# Patient Record
Sex: FEMALE | Race: WHITE | NOT HISPANIC OR LATINO | URBAN - METROPOLITAN AREA
[De-identification: names, ages, dates, MRNs, and addresses within clinical notes are randomized per-mention and may not be internally consistent; named-entity substitution may affect disease eponyms.]

---

## 2017-06-13 ENCOUNTER — INPATIENT (INPATIENT)
Facility: HOSPITAL | Age: 37
LOS: 2 days | Discharge: HOME | DRG: 355 | End: 2017-06-16
Attending: SURGERY | Admitting: SURGERY
Payer: COMMERCIAL

## 2017-06-13 DIAGNOSIS — Z98.890 OTHER SPECIFIED POSTPROCEDURAL STATES: Chronic | ICD-10-CM

## 2017-06-13 PROCEDURE — 99285 EMERGENCY DEPT VISIT HI MDM: CPT | Mod: 25

## 2017-06-13 NOTE — ED ADULT TRIAGE NOTE - CHIEF COMPLAINT QUOTE
abd pain x 2 wks, +nausea/vomiting, recently treated for strep, denies diarrhea/fever, (hx "tummy tuck" x 4 yrs ago)

## 2017-06-13 NOTE — ED ADULT NURSE NOTE - OBJECTIVE STATEMENT
36 year old female patient presents to ED c/o intermittent generalized abdominal pain and bloating x 2 weeks. Patient went to urgent care today and sent to ER for US to r/o gallstones. Patient reports abd pain and discomfort upon movement, denies pain at rest. Denies taking home medications for pain. Denies n/v/d, fever, chills, urinary symptoms. VSS

## 2017-06-14 VITALS
OXYGEN SATURATION: 98 % | TEMPERATURE: 98 F | SYSTOLIC BLOOD PRESSURE: 133 MMHG | HEART RATE: 73 BPM | RESPIRATION RATE: 16 BRPM | DIASTOLIC BLOOD PRESSURE: 84 MMHG

## 2017-06-14 DIAGNOSIS — K42.9 UMBILICAL HERNIA WITHOUT OBSTRUCTION OR GANGRENE: ICD-10-CM

## 2017-06-14 LAB
ALBUMIN SERPL ELPH-MCNC: 4.4 G/DL — SIGNIFICANT CHANGE UP (ref 3.3–5)
ALP SERPL-CCNC: 53 U/L — SIGNIFICANT CHANGE UP (ref 40–120)
ALT FLD-CCNC: 21 U/L RC — SIGNIFICANT CHANGE UP (ref 10–45)
ANION GAP SERPL CALC-SCNC: 15 MMOL/L — SIGNIFICANT CHANGE UP (ref 5–17)
APPEARANCE UR: CLEAR — SIGNIFICANT CHANGE UP
AST SERPL-CCNC: 18 U/L — SIGNIFICANT CHANGE UP (ref 10–40)
BASOPHILS # BLD AUTO: 0.1 K/UL — SIGNIFICANT CHANGE UP (ref 0–0.2)
BASOPHILS NFR BLD AUTO: 0.7 % — SIGNIFICANT CHANGE UP (ref 0–2)
BILIRUB SERPL-MCNC: 0.3 MG/DL — SIGNIFICANT CHANGE UP (ref 0.2–1.2)
BILIRUB UR-MCNC: NEGATIVE — SIGNIFICANT CHANGE UP
BUN SERPL-MCNC: 20 MG/DL — SIGNIFICANT CHANGE UP (ref 7–23)
CALCIUM SERPL-MCNC: 9.6 MG/DL — SIGNIFICANT CHANGE UP (ref 8.4–10.5)
CHLORIDE SERPL-SCNC: 101 MMOL/L — SIGNIFICANT CHANGE UP (ref 96–108)
CO2 SERPL-SCNC: 24 MMOL/L — SIGNIFICANT CHANGE UP (ref 22–31)
COLOR SPEC: SIGNIFICANT CHANGE UP
CREAT SERPL-MCNC: 0.91 MG/DL — SIGNIFICANT CHANGE UP (ref 0.5–1.3)
DIFF PNL FLD: NEGATIVE — SIGNIFICANT CHANGE UP
EOSINOPHIL # BLD AUTO: 0.1 K/UL — SIGNIFICANT CHANGE UP (ref 0–0.5)
EOSINOPHIL NFR BLD AUTO: 1.1 % — SIGNIFICANT CHANGE UP (ref 0–6)
GLUCOSE SERPL-MCNC: 87 MG/DL — SIGNIFICANT CHANGE UP (ref 70–99)
GLUCOSE UR QL: NEGATIVE — SIGNIFICANT CHANGE UP
HCG UR QL: NEGATIVE — SIGNIFICANT CHANGE UP
HCT VFR BLD CALC: 38.8 % — SIGNIFICANT CHANGE UP (ref 34.5–45)
HGB BLD-MCNC: 12.6 G/DL — SIGNIFICANT CHANGE UP (ref 11.5–15.5)
KETONES UR-MCNC: NEGATIVE — SIGNIFICANT CHANGE UP
LEUKOCYTE ESTERASE UR-ACNC: NEGATIVE — SIGNIFICANT CHANGE UP
LIDOCAIN IGE QN: 35 U/L — SIGNIFICANT CHANGE UP (ref 7–60)
LYMPHOCYTES # BLD AUTO: 29.3 % — SIGNIFICANT CHANGE UP (ref 13–44)
LYMPHOCYTES # BLD AUTO: 3.4 K/UL — HIGH (ref 1–3.3)
MCHC RBC-ENTMCNC: 28.6 PG — SIGNIFICANT CHANGE UP (ref 27–34)
MCHC RBC-ENTMCNC: 32.6 GM/DL — SIGNIFICANT CHANGE UP (ref 32–36)
MCV RBC AUTO: 87.7 FL — SIGNIFICANT CHANGE UP (ref 80–100)
MONOCYTES # BLD AUTO: 0.9 K/UL — SIGNIFICANT CHANGE UP (ref 0–0.9)
MONOCYTES NFR BLD AUTO: 7.9 % — SIGNIFICANT CHANGE UP (ref 2–14)
NEUTROPHILS # BLD AUTO: 7 K/UL — SIGNIFICANT CHANGE UP (ref 1.8–7.4)
NEUTROPHILS NFR BLD AUTO: 60.9 % — SIGNIFICANT CHANGE UP (ref 43–77)
NITRITE UR-MCNC: NEGATIVE — SIGNIFICANT CHANGE UP
PH UR: 7.5 — SIGNIFICANT CHANGE UP (ref 5–8)
PLATELET # BLD AUTO: 265 K/UL — SIGNIFICANT CHANGE UP (ref 150–400)
POTASSIUM SERPL-MCNC: 4 MMOL/L — SIGNIFICANT CHANGE UP (ref 3.5–5.3)
POTASSIUM SERPL-SCNC: 4 MMOL/L — SIGNIFICANT CHANGE UP (ref 3.5–5.3)
PROT SERPL-MCNC: 7.5 G/DL — SIGNIFICANT CHANGE UP (ref 6–8.3)
PROT UR-MCNC: NEGATIVE — SIGNIFICANT CHANGE UP
RBC # BLD: 4.42 M/UL — SIGNIFICANT CHANGE UP (ref 3.8–5.2)
RBC # FLD: 12.5 % — SIGNIFICANT CHANGE UP (ref 10.3–14.5)
SODIUM SERPL-SCNC: 140 MMOL/L — SIGNIFICANT CHANGE UP (ref 135–145)
SP GR SPEC: 1.01 — SIGNIFICANT CHANGE UP (ref 1.01–1.02)
UROBILINOGEN FLD QL: NEGATIVE — SIGNIFICANT CHANGE UP
WBC # BLD: 11.5 K/UL — HIGH (ref 3.8–10.5)
WBC # FLD AUTO: 11.5 K/UL — HIGH (ref 3.8–10.5)

## 2017-06-14 PROCEDURE — 76700 US EXAM ABDOM COMPLETE: CPT | Mod: 26

## 2017-06-14 PROCEDURE — 99236 HOSP IP/OBS SAME DATE HI 85: CPT

## 2017-06-14 PROCEDURE — 74177 CT ABD & PELVIS W/CONTRAST: CPT | Mod: 26

## 2017-06-14 RX ORDER — SODIUM CHLORIDE 9 MG/ML
1000 INJECTION INTRAMUSCULAR; INTRAVENOUS; SUBCUTANEOUS ONCE
Qty: 0 | Refills: 0 | Status: COMPLETED | OUTPATIENT
Start: 2017-06-14 | End: 2017-06-14

## 2017-06-14 RX ORDER — METOCLOPRAMIDE HCL 10 MG
10 TABLET ORAL ONCE
Qty: 0 | Refills: 0 | Status: COMPLETED | OUTPATIENT
Start: 2017-06-14 | End: 2017-06-14

## 2017-06-14 RX ORDER — SODIUM CHLORIDE 9 MG/ML
1000 INJECTION, SOLUTION INTRAVENOUS
Qty: 0 | Refills: 0 | Status: DISCONTINUED | OUTPATIENT
Start: 2017-06-14 | End: 2017-06-15

## 2017-06-14 RX ORDER — SODIUM CHLORIDE 9 MG/ML
3 INJECTION INTRAMUSCULAR; INTRAVENOUS; SUBCUTANEOUS ONCE
Qty: 0 | Refills: 0 | Status: COMPLETED | OUTPATIENT
Start: 2017-06-14 | End: 2017-06-14

## 2017-06-14 RX ORDER — ENOXAPARIN SODIUM 100 MG/ML
40 INJECTION SUBCUTANEOUS DAILY
Qty: 0 | Refills: 0 | Status: DISCONTINUED | OUTPATIENT
Start: 2017-06-14 | End: 2017-06-16

## 2017-06-14 RX ORDER — ACETAMINOPHEN 500 MG
1000 TABLET ORAL ONCE
Qty: 0 | Refills: 0 | Status: COMPLETED | OUTPATIENT
Start: 2017-06-14 | End: 2017-06-14

## 2017-06-14 RX ORDER — PANTOPRAZOLE SODIUM 20 MG/1
40 TABLET, DELAYED RELEASE ORAL ONCE
Qty: 0 | Refills: 0 | Status: COMPLETED | OUTPATIENT
Start: 2017-06-14 | End: 2017-06-14

## 2017-06-14 RX ORDER — LIDOCAINE 4 G/100G
10 CREAM TOPICAL ONCE
Qty: 0 | Refills: 0 | Status: COMPLETED | OUTPATIENT
Start: 2017-06-14 | End: 2017-06-14

## 2017-06-14 RX ORDER — FAMOTIDINE 10 MG/ML
20 INJECTION INTRAVENOUS ONCE
Qty: 0 | Refills: 0 | Status: COMPLETED | OUTPATIENT
Start: 2017-06-14 | End: 2017-06-14

## 2017-06-14 RX ORDER — MORPHINE SULFATE 50 MG/1
2 CAPSULE, EXTENDED RELEASE ORAL ONCE
Qty: 0 | Refills: 0 | Status: DISCONTINUED | OUTPATIENT
Start: 2017-06-14 | End: 2017-06-14

## 2017-06-14 RX ORDER — ONDANSETRON 8 MG/1
4 TABLET, FILM COATED ORAL ONCE
Qty: 0 | Refills: 0 | Status: COMPLETED | OUTPATIENT
Start: 2017-06-14 | End: 2017-06-14

## 2017-06-14 RX ORDER — MORPHINE SULFATE 50 MG/1
4 CAPSULE, EXTENDED RELEASE ORAL ONCE
Qty: 0 | Refills: 0 | Status: DISCONTINUED | OUTPATIENT
Start: 2017-06-14 | End: 2017-06-14

## 2017-06-14 RX ADMIN — FAMOTIDINE 20 MILLIGRAM(S): 10 INJECTION INTRAVENOUS at 01:50

## 2017-06-14 RX ADMIN — Medication 400 MILLIGRAM(S): at 01:43

## 2017-06-14 RX ADMIN — SODIUM CHLORIDE 3 MILLILITER(S): 9 INJECTION INTRAMUSCULAR; INTRAVENOUS; SUBCUTANEOUS at 06:44

## 2017-06-14 RX ADMIN — ONDANSETRON 4 MILLIGRAM(S): 8 TABLET, FILM COATED ORAL at 06:44

## 2017-06-14 RX ADMIN — SODIUM CHLORIDE 1000 MILLILITER(S): 9 INJECTION INTRAMUSCULAR; INTRAVENOUS; SUBCUTANEOUS at 06:00

## 2017-06-14 RX ADMIN — MORPHINE SULFATE 4 MILLIGRAM(S): 50 CAPSULE, EXTENDED RELEASE ORAL at 06:44

## 2017-06-14 RX ADMIN — PANTOPRAZOLE SODIUM 40 MILLIGRAM(S): 20 TABLET, DELAYED RELEASE ORAL at 09:03

## 2017-06-14 RX ADMIN — Medication 400 MILLIGRAM(S): at 22:04

## 2017-06-14 RX ADMIN — Medication 1000 MILLIGRAM(S): at 06:40

## 2017-06-14 RX ADMIN — Medication 30 MILLILITER(S): at 01:44

## 2017-06-14 RX ADMIN — SODIUM CHLORIDE 1000 MILLILITER(S): 9 INJECTION INTRAMUSCULAR; INTRAVENOUS; SUBCUTANEOUS at 13:17

## 2017-06-14 RX ADMIN — Medication 10 MILLIGRAM(S): at 09:03

## 2017-06-14 RX ADMIN — MORPHINE SULFATE 2 MILLIGRAM(S): 50 CAPSULE, EXTENDED RELEASE ORAL at 14:45

## 2017-06-14 RX ADMIN — Medication 1000 MILLIGRAM(S): at 22:43

## 2017-06-14 RX ADMIN — MORPHINE SULFATE 2 MILLIGRAM(S): 50 CAPSULE, EXTENDED RELEASE ORAL at 15:30

## 2017-06-14 RX ADMIN — Medication 30 MILLILITER(S): at 13:16

## 2017-06-14 RX ADMIN — LIDOCAINE 10 MILLILITER(S): 4 CREAM TOPICAL at 01:44

## 2017-06-14 NOTE — ED ADULT NURSE REASSESSMENT NOTE - NS ED NURSE REASSESS COMMENT FT1
Patient reporting generalized abd pain, medicated as per MDs orders. VSS. Discussed plan of care with patient, patient states she is wishing to be admitted for further work up of abd pain. MD gamez

## 2017-06-14 NOTE — ED CDU PROVIDER NOTE - ATTENDING CONTRIBUTION TO CARE
I was physically present for the E/M service provided. I agree with above history, physical, and plan which I have reviewed and edited where appropriate. I was physically present for the key portions of the service provided.    See mdm

## 2017-06-14 NOTE — ED CDU PROVIDER NOTE - OBJECTIVE STATEMENT
36F p/w c/o 2 days of epigastric pain gradual in onset and described as "pain" a/w nausea. No vomiting. No diarrhea. No dysuria. No h/o abdominal surgery. 36F p/w c/o mild epigastric pain for 2 weeks, gradual in onset. +nausea. No vomiting. No diarrhea. No dysuria. No h/o abdominal surgery. Pt states pain has worsened significantly in RLQ over the past two days. No fevers , no chills.

## 2017-06-14 NOTE — ED CDU PROVIDER NOTE - MEDICAL DECISION MAKING DETAILS
Epigastric pain: Abdomen soft with focal epigastric TTP. RUQ US negative for acute pathology. Lipase + LFTs WNL. Persistent pain after GI cocktail, pepcid and morphine, CDU for IVF, Sx control and serial abdominal exams. FLORA

## 2017-06-14 NOTE — ED CDU PROVIDER NOTE - DETAILS
Frequent re-evaluations  IV hydration  Pain control Gastritis  Frequent re-evaluations  IV hydration  Pain control Abdominal Pain  Frequent re-evaluations  IV hydration  Pain control

## 2017-06-14 NOTE — ED CDU PROVIDER NOTE - PROGRESS NOTE DETAILS
Pt c/o abdominal pain. Minimal relief from medications. Discussed with Dr. Herrera. Will order CT abd/pelvis. -Kaya Nixon PA-C CT abd shows tiny fat containing umbilical hernia with possible incarceration, no bowel involvement.  Surgery consult called as pt wants to see them. Discussed with Dr. Herrera. -Kaya Nixon PA-C I have personally performed a face to face diagnostic evaluation on this patient.  I have reviewed the ACP note and agree with the history, exam, and plan of care, except as noted.  History and Exam by me shows  Attn - pt with  periumbilical abdo pain for two weeks got worse.  US negative and CT ab/pelvis - fat containing umbilical hernia.  pain control.  Pt stable for D/C. surgery to bedside will plan for PO challenge and reevaluation. - Mary Irizarry PA-C patient tolerated PO but ist still in pain. will admit to surgery. will discuss admission with Dr. Babin CDU Attending Note -- Agree with above.  Patient c fat containing umbilical hernia (?strangulated?); pain control recs as per sx.  Pt able to tolerate PO but still in pain.  Will admit to surgery service.  VSS.  Admitted.  --HANY

## 2017-06-14 NOTE — ED CDU PROVIDER NOTE - SHIFT CHANGE DETAILS
[] Care transferred to Dr. Sharon AM CDU Attending from [facility] overnight ED attending.

## 2017-06-14 NOTE — ED CDU PROVIDER NOTE - PLAN OF CARE
Follow up with your Primary Care Physician within the next 2-3 days  Bring a copy of your test results with you to your appointment  Continue your current medication regimen  Return to the Emergency Room if you experience new or worsening symptoms  Take Motrin 400-600mg every 6 hrs as needed for pain. Take with food  Take Tylenol 650mg every 6 hrs as needed for pain.   Follow up with surgery outpatient- [phone]

## 2017-06-14 NOTE — ED PROVIDER NOTE - PROGRESS NOTE DETAILS
Pt still having pain and concerned about going home. Abdomen soft with moderate epigastric TTP. Morphine given. Pt would like CDU for IVF and Sx control. Discussed dx of gastritis, diet modification, pepcid + Carafate, GI f/u with ptJerrell GRIFFITH.

## 2017-06-14 NOTE — ED PROVIDER NOTE - OBJECTIVE STATEMENT
36F p/w c/o 2 days of epigastric pain gradual in onset and described as "pain" a/w nausea. No vomiting. No diarrhea. No dysuria. No h/o abdominal surgery.

## 2017-06-14 NOTE — ED ADULT NURSE REASSESSMENT NOTE - NS ED NURSE REASSESS COMMENT FT1
Received pt from ANIRUDH Arreola RN. Pt is resting in bed, c/o RUQ pain. Plan of care discussed w. pt by Chris PRINCE @ 3100 - pt agreed to CDU placement. Denies CP/SOB/N/V. VSS. Afebrile. Abd soft, RUQ tender, ND, +BSx4. #20G LAC, patent, no signs of redness/swelling noted to site. Will continue to monitor. Awaiting eval by CDU team.

## 2017-06-15 ENCOUNTER — TRANSCRIPTION ENCOUNTER (OUTPATIENT)
Age: 37
End: 2017-06-15

## 2017-06-15 ENCOUNTER — RESULT REVIEW (OUTPATIENT)
Age: 37
End: 2017-06-15

## 2017-06-15 DIAGNOSIS — K42.9 UMBILICAL HERNIA WITHOUT OBSTRUCTION OR GANGRENE: ICD-10-CM

## 2017-06-15 DIAGNOSIS — H52.00 HYPERMETROPIA, UNSPECIFIED EYE: ICD-10-CM

## 2017-06-15 DIAGNOSIS — Z90.79 ACQUIRED ABSENCE OF OTHER GENITAL ORGAN(S): Chronic | ICD-10-CM

## 2017-06-15 DIAGNOSIS — Z98.890 OTHER SPECIFIED POSTPROCEDURAL STATES: Chronic | ICD-10-CM

## 2017-06-15 LAB
ANION GAP SERPL CALC-SCNC: 9 MMOL/L — SIGNIFICANT CHANGE UP (ref 5–17)
APTT BLD: 30.1 SEC — SIGNIFICANT CHANGE UP (ref 27.5–37.4)
BLD GP AB SCN SERPL QL: NEGATIVE — SIGNIFICANT CHANGE UP
BUN SERPL-MCNC: 14 MG/DL — SIGNIFICANT CHANGE UP (ref 7–23)
CALCIUM SERPL-MCNC: 8 MG/DL — LOW (ref 8.4–10.5)
CHLORIDE SERPL-SCNC: 106 MMOL/L — SIGNIFICANT CHANGE UP (ref 96–108)
CO2 SERPL-SCNC: 24 MMOL/L — SIGNIFICANT CHANGE UP (ref 22–31)
CREAT SERPL-MCNC: 0.81 MG/DL — SIGNIFICANT CHANGE UP (ref 0.5–1.3)
GLUCOSE SERPL-MCNC: 96 MG/DL — SIGNIFICANT CHANGE UP (ref 70–99)
HCG SERPL QL: NEGATIVE — SIGNIFICANT CHANGE UP
HCT VFR BLD CALC: 34 % — LOW (ref 34.5–45)
HGB BLD-MCNC: 11.3 G/DL — LOW (ref 11.5–15.5)
INR BLD: 1.1 RATIO — SIGNIFICANT CHANGE UP (ref 0.88–1.16)
MAGNESIUM SERPL-MCNC: 2 MG/DL — SIGNIFICANT CHANGE UP (ref 1.6–2.6)
MCHC RBC-ENTMCNC: 29.9 PG — SIGNIFICANT CHANGE UP (ref 27–34)
MCHC RBC-ENTMCNC: 33.4 GM/DL — SIGNIFICANT CHANGE UP (ref 32–36)
MCV RBC AUTO: 89.5 FL — SIGNIFICANT CHANGE UP (ref 80–100)
PHOSPHATE SERPL-MCNC: 3.8 MG/DL — SIGNIFICANT CHANGE UP (ref 2.5–4.5)
PLATELET # BLD AUTO: 228 K/UL — SIGNIFICANT CHANGE UP (ref 150–400)
POTASSIUM SERPL-MCNC: 4.2 MMOL/L — SIGNIFICANT CHANGE UP (ref 3.5–5.3)
POTASSIUM SERPL-SCNC: 4.2 MMOL/L — SIGNIFICANT CHANGE UP (ref 3.5–5.3)
PROTHROM AB SERPL-ACNC: 12 SEC — SIGNIFICANT CHANGE UP (ref 9.8–12.7)
RBC # BLD: 3.8 M/UL — SIGNIFICANT CHANGE UP (ref 3.8–5.2)
RBC # FLD: 12.5 % — SIGNIFICANT CHANGE UP (ref 10.3–14.5)
RH IG SCN BLD-IMP: POSITIVE — SIGNIFICANT CHANGE UP
RH IG SCN BLD-IMP: POSITIVE — SIGNIFICANT CHANGE UP
SODIUM SERPL-SCNC: 139 MMOL/L — SIGNIFICANT CHANGE UP (ref 135–145)
WBC # BLD: 6.9 K/UL — SIGNIFICANT CHANGE UP (ref 3.8–10.5)
WBC # FLD AUTO: 6.9 K/UL — SIGNIFICANT CHANGE UP (ref 3.8–10.5)

## 2017-06-15 PROCEDURE — 86901 BLOOD TYPING SEROLOGIC RH(D): CPT

## 2017-06-15 PROCEDURE — 86900 BLOOD TYPING SEROLOGIC ABO: CPT

## 2017-06-15 PROCEDURE — C1781: CPT

## 2017-06-15 PROCEDURE — 80048 BASIC METABOLIC PNL TOTAL CA: CPT

## 2017-06-15 PROCEDURE — 86850 RBC ANTIBODY SCREEN: CPT

## 2017-06-15 PROCEDURE — 83735 ASSAY OF MAGNESIUM: CPT

## 2017-06-15 PROCEDURE — 85610 PROTHROMBIN TIME: CPT

## 2017-06-15 PROCEDURE — 74177 CT ABD & PELVIS W/CONTRAST: CPT

## 2017-06-15 PROCEDURE — 93010 ELECTROCARDIOGRAM REPORT: CPT

## 2017-06-15 PROCEDURE — 93005 ELECTROCARDIOGRAM TRACING: CPT

## 2017-06-15 PROCEDURE — 85730 THROMBOPLASTIN TIME PARTIAL: CPT

## 2017-06-15 PROCEDURE — G0378: CPT

## 2017-06-15 PROCEDURE — 99284 EMERGENCY DEPT VISIT MOD MDM: CPT | Mod: 25

## 2017-06-15 PROCEDURE — 84100 ASSAY OF PHOSPHORUS: CPT

## 2017-06-15 PROCEDURE — 96376 TX/PRO/DX INJ SAME DRUG ADON: CPT

## 2017-06-15 PROCEDURE — 81003 URINALYSIS AUTO W/O SCOPE: CPT

## 2017-06-15 PROCEDURE — 83690 ASSAY OF LIPASE: CPT

## 2017-06-15 PROCEDURE — 85027 COMPLETE CBC AUTOMATED: CPT

## 2017-06-15 PROCEDURE — 76700 US EXAM ABDOM COMPLETE: CPT

## 2017-06-15 PROCEDURE — 80053 COMPREHEN METABOLIC PANEL: CPT

## 2017-06-15 PROCEDURE — 84703 CHORIONIC GONADOTROPIN ASSAY: CPT

## 2017-06-15 PROCEDURE — 96375 TX/PRO/DX INJ NEW DRUG ADDON: CPT | Mod: XU

## 2017-06-15 PROCEDURE — 81025 URINE PREGNANCY TEST: CPT

## 2017-06-15 PROCEDURE — 96374 THER/PROPH/DIAG INJ IV PUSH: CPT

## 2017-06-15 RX ORDER — ONDANSETRON 8 MG/1
4 TABLET, FILM COATED ORAL EVERY 6 HOURS
Qty: 0 | Refills: 0 | Status: DISCONTINUED | OUTPATIENT
Start: 2017-06-15 | End: 2017-06-16

## 2017-06-15 RX ORDER — OXYCODONE HYDROCHLORIDE 5 MG/1
5 TABLET ORAL EVERY 4 HOURS
Qty: 0 | Refills: 0 | Status: DISCONTINUED | OUTPATIENT
Start: 2017-06-15 | End: 2017-06-15

## 2017-06-15 RX ORDER — OXYCODONE HYDROCHLORIDE 5 MG/1
1 TABLET ORAL
Qty: 18 | Refills: 0 | OUTPATIENT
Start: 2017-06-15 | End: 2017-06-18

## 2017-06-15 RX ORDER — HYDROMORPHONE HYDROCHLORIDE 2 MG/ML
0.5 INJECTION INTRAMUSCULAR; INTRAVENOUS; SUBCUTANEOUS
Qty: 0 | Refills: 0 | Status: DISCONTINUED | OUTPATIENT
Start: 2017-06-15 | End: 2017-06-15

## 2017-06-15 RX ORDER — DOCUSATE SODIUM 100 MG
100 CAPSULE ORAL THREE TIMES A DAY
Qty: 0 | Refills: 0 | Status: DISCONTINUED | OUTPATIENT
Start: 2017-06-15 | End: 2017-06-16

## 2017-06-15 RX ORDER — ACETAMINOPHEN 500 MG
650 TABLET ORAL EVERY 6 HOURS
Qty: 0 | Refills: 0 | Status: DISCONTINUED | OUTPATIENT
Start: 2017-06-15 | End: 2017-06-16

## 2017-06-15 RX ORDER — OXYCODONE HYDROCHLORIDE 5 MG/1
5 TABLET ORAL EVERY 4 HOURS
Qty: 0 | Refills: 0 | Status: DISCONTINUED | OUTPATIENT
Start: 2017-06-15 | End: 2017-06-16

## 2017-06-15 RX ORDER — IBUPROFEN 200 MG
1 TABLET ORAL
Qty: 0 | Refills: 0 | COMMUNITY
Start: 2017-06-15

## 2017-06-15 RX ORDER — ACETAMINOPHEN 500 MG
2 TABLET ORAL
Qty: 0 | Refills: 0 | COMMUNITY
Start: 2017-06-15

## 2017-06-15 RX ORDER — IBUPROFEN 200 MG
400 TABLET ORAL EVERY 6 HOURS
Qty: 0 | Refills: 0 | Status: DISCONTINUED | OUTPATIENT
Start: 2017-06-15 | End: 2017-06-16

## 2017-06-15 RX ORDER — SODIUM CHLORIDE 9 MG/ML
3 INJECTION INTRAMUSCULAR; INTRAVENOUS; SUBCUTANEOUS EVERY 8 HOURS
Qty: 0 | Refills: 0 | Status: DISCONTINUED | OUTPATIENT
Start: 2017-06-15 | End: 2017-06-16

## 2017-06-15 RX ORDER — FAMOTIDINE 10 MG/ML
20 INJECTION INTRAVENOUS EVERY 12 HOURS
Qty: 0 | Refills: 0 | Status: DISCONTINUED | OUTPATIENT
Start: 2017-06-15 | End: 2017-06-16

## 2017-06-15 RX ORDER — OXYCODONE HYDROCHLORIDE 5 MG/1
10 TABLET ORAL EVERY 4 HOURS
Qty: 0 | Refills: 0 | Status: DISCONTINUED | OUTPATIENT
Start: 2017-06-15 | End: 2017-06-15

## 2017-06-15 RX ORDER — DOCUSATE SODIUM 100 MG
1 CAPSULE ORAL
Qty: 0 | Refills: 0 | COMMUNITY
Start: 2017-06-15

## 2017-06-15 RX ADMIN — Medication 650 MILLIGRAM(S): at 14:42

## 2017-06-15 RX ADMIN — Medication 100 MILLIGRAM(S): at 21:40

## 2017-06-15 RX ADMIN — Medication 400 MILLIGRAM(S): at 14:43

## 2017-06-15 RX ADMIN — Medication 400 MILLIGRAM(S): at 15:15

## 2017-06-15 RX ADMIN — ENOXAPARIN SODIUM 40 MILLIGRAM(S): 100 INJECTION SUBCUTANEOUS at 12:19

## 2017-06-15 RX ADMIN — Medication 650 MILLIGRAM(S): at 15:15

## 2017-06-15 RX ADMIN — Medication 10 MILLIGRAM(S): at 19:30

## 2017-06-15 RX ADMIN — SODIUM CHLORIDE 75 MILLILITER(S): 9 INJECTION, SOLUTION INTRAVENOUS at 06:35

## 2017-06-15 RX ADMIN — SODIUM CHLORIDE 3 MILLILITER(S): 9 INJECTION INTRAMUSCULAR; INTRAVENOUS; SUBCUTANEOUS at 22:00

## 2017-06-15 NOTE — DISCHARGE NOTE ADULT - PLAN OF CARE
s/p umbilical hernia repair with mesh. Pain control and outpatient. Please do not drive until your pain is well controlled, and you do not need to take pain medications. These medications may make you constipated. If so, please take over the counter stool softeners for symptoms.  Please follow up with Dr. Roman Monday at [facility]. Contact info below. Please do not drive until your pain is well controlled, and you do not need to take pain medications. These medications may make you constipated. If so, please take over the counter stool softeners for symptoms.  Please follow up with Dr. Roman Monday at [facility]. Contact info below. Please follow up with Ophthalmology, Dr. Pacheco, in 1-2 weeks for a repeat eye exam. The office address is [address] [place] [address] eye [facility]; Please call [phone] to schedule an appointment.

## 2017-06-15 NOTE — H&P ADULT - NSHPLABSRESULTS_GEN_ALL_CORE
LABS:                        12.6   11.5  )-----------( 265      ( 2017 01:53 )             38.8     06-14    140  |  101  |  20  ----------------------------<  87  4.0   |  24  |  0.91    Ca    9.6      2017 01:53    TPro  7.5  /  Alb  4.4  /  TBili  0.3  /  DBili  x   /  AST  18  /  ALT  21  /  AlkPhos  53  06-14      Urinalysis Basic - ( 2017 01:53 )    Color: PL Yellow / Appearance: Clear / S.014 / pH: x  Gluc: x / Ketone: Negative  / Bili: Negative / Urobili: Negative   Blood: x / Protein: Negative / Nitrite: Negative   Leuk Esterase: Negative / RBC: x / WBC x   Sq Epi: x / Non Sq Epi: x / Bacteria: x    Imaging  CT scan:  Tiny fat-containing umbilical hernia with possible incarceration. There   is no involvement of the bowel.    No appendicitis, colitis or bowel obstruction.

## 2017-06-15 NOTE — DISCHARGE NOTE ADULT - ADDITIONAL INSTRUCTIONS
Please call for a follow up appointment with your primary care medical doctor upon discharge regarding your recent surgery and hospitalization.

## 2017-06-15 NOTE — PROVIDER CONTACT NOTE (OTHER) - SITUATION
EKG revealed sinus bradycardia with marked sinus arrythmia. Normal sinus rhythm with prolonged QT. EKG revealed sinus bradycardia with marked sinus arrythmia. Normal sinus rhythm with prolonged QT. BP 94/67, HR 59

## 2017-06-15 NOTE — CONSULT NOTE ADULT - ASSESSMENT
37 yo female presents with hyperopia post op of unknow origin. Given normal neurologic exam there is no clinical evidence of intracranial pathology.

## 2017-06-15 NOTE — PROVIDER CONTACT NOTE (OTHER) - ASSESSMENT
Pt is asymptomatic of signs and symptoms of any acute disterss Pt is asymptomatic of signs and symptoms of any acute distress

## 2017-06-15 NOTE — CONSULT NOTE ADULT - SUBJECTIVE AND OBJECTIVE BOX
Called to see patient for complaint of blurred vision post umbilical hernia repair. Patient reports unable to read phone text or complete computer work that she was able to do pre-op.  Denies burning/ itching/ diplopia/ photophobia.  Advised patient to follow up with opthalmology exam, spoke with surgical resident regarding assessment. Called to see patient for complaint of blurred vision post umbilical hernia repair. Patient reports unable to read phone text or complete computer work that she was able to do pre-op.  Denies burning/ itching/ diplopia/ photophobia.  Advised follow up with opthalmology/neurology, spoke with surgical resident regarding assessment.

## 2017-06-15 NOTE — DISCHARGE NOTE ADULT - INSTRUCTIONS
Regular diet  You may shower. Pat Dry abdomen. Leave the white steri strips in place, they will fall off on their own in approximately 5-7 days.   NOTIFY YOUR SURGEON IF: You have any bleeding that does not stop, any pus draining from your wound, any fever (over 100.4 F) or chills, persistent nausea/vomiting, persistent diarrhea, or if your pain is not controlled on your discharge pain medications.

## 2017-06-15 NOTE — H&P ADULT - PROBLEM SELECTOR PLAN 1
Admit to Surgery  -NPO pMN for OR tomorrow  -Pain Control  -AM Pre-op Labs  -EKG  -IVF  -Discussed with fellow

## 2017-06-15 NOTE — H&P ADULT - NSHPPHYSICALEXAM_GEN_ALL_CORE
Vital Signs Last 24 Hrs  T(C): 36.7, Max: 36.8 (06-14 @ 18:44)  T(F): 98, Max: 98.2 (06-14 @ 18:44)  HR: 70 (54 - 83)  BP: 100/62 (85/49 - 133/84)  BP(mean): --  RR: 18 (16 - 18)  SpO2: 98% (96% - 99%)    General: NAD  Neuro: CNs intact, motor/sensory intact  Pulm: CTAB  CV: RRR  Abd: soft, non-distended, tender in umbilicus  Skin: no rash

## 2017-06-15 NOTE — CONSULT NOTE ADULT - ASSESSMENT
36 F POD#0 s/p umbilical hernia repair, c/o post op blurry vision near > distance  - VA improves to 20/20 w/ pinhole   - 36 F POD#0 s/p umbilical hernia repair, c/o post op blurry vision near > distance  - VA improves to 20/20 w/ pinhole, no APD, dilated exam normal   - patient appears to be presbyopic - difficulty with near vision, correctable with pinhole. patient is certain near vision was fine yesterday  - suspect the stress of surgery is impairing her limited remaining accomodation. will likely improve with rest. Patient may need reading glasses soon.   - f/u in 1-2 weeks at [address] [place] [address] eye clinic for repeat eye exam; [phone]

## 2017-06-15 NOTE — PROVIDER CONTACT NOTE (OTHER) - RECOMMENDATIONS
MD will look at the EKG later. No other intervention required now MD looked at the EKG. No other intervention required now

## 2017-06-15 NOTE — DISCHARGE NOTE ADULT - CARE PROVIDERS DIRECT ADDRESSES
[email or web address]

## 2017-06-15 NOTE — H&P ADULT - FAMILY HISTORY
Father  Still living? Unknown  Family history of pancreatic cancer, Age at diagnosis: Age Unknown     Aunt  Still living? Unknown  Family history of breast cancer, Age at diagnosis: Age Unknown

## 2017-06-15 NOTE — PROGRESS NOTE ADULT - SUBJECTIVE AND OBJECTIVE BOX
[facility] Post op Note    Procedure umbilical hernia repair with mesh     SUBJECTIVE: Pt seen doing well, tolerating diet   SOB:  [ ] YES [x ] NO  Chest Discomfort: [ ] YES [x ] NO    Nausea: [ ] YES [ x] NO           Vomiting: [ ] YES [ x] NO  Flatus: [ ] YES [x ] NO             Bowel Movement: [ ] YES [x ] NO  Void: [ x]YES [ ]No         Pain Control Adequate: [x ] YES [ ] NO          Vital Signs Last 24 Hrs  T(C): 36.4, Max: 36.8 (- @ 18:44)  T(F): 97.5, Max: 98.2 (06-14 @ 18:44)  HR: 78 (52 - 98)  BP: 109/71 (85/49 - 113/54)  BP(mean): 72 (72 - 81)  RR: 18 (16 - 18)  SpO2: 98% (96% - 100%)  I&O's Summary  I & Os for 24h ending 15 Leonid 2017 07:00  =============================================  IN: 1165 ml / OUT: 350 ml / NET: 815 ml    I & Os for current day (as of 15 Leonid 2017 13:57)  =============================================  IN: 270 ml / OUT: 700 ml / NET: -430 ml    I&O's Detail  I & Os for 24h ending 15 Leonid 2017 07:00  =============================================  IN:    lactated ringers.: 825 ml    Oral Fluid: 240 ml    IV PiggyBack: 100 ml    Total IN: 1165 ml  ---------------------------------------------  OUT:    Voided: 350 ml    Total OUT: 350 ml  ---------------------------------------------  Total NET: 815 ml    I & Os for current day (as of 15 Leonid 2017 13:57)  =============================================  IN:    lactated ringers.: 150 ml    Oral Fluid: 120 ml    Total IN: 270 ml  ---------------------------------------------  OUT:    Voided: 700 ml    Total OUT: 700 ml  ---------------------------------------------  Total NET: -430 ml      MEDICATIONS  (STANDING):  lactated ringers. 1000milliLiter(s) IV Continuous <Continuous>  enoxaparin Injectable 40milliGRAM(s) SubCutaneous daily    MEDICATIONS  (PRN):  oxyCODONE IR 5milliGRAM(s) Oral every 4 hours PRN Moderate Pain (4 - 6)  oxyCODONE IR 10milliGRAM(s) Oral every 4 hours PRN Severe Pain (7 - 10)  oxyCODONE IR 5milliGRAM(s) Oral every 4 hours PRN Severe Pain (7 - 10)  ondansetron Injectable 4milliGRAM(s) IV Push every 6 hours PRN Nausea  aluminum hydroxide/magnesium hydroxide/simethicone Suspension 30milliLiter(s) Oral every 4 hours PRN Dyspepsia  famotidine Injectable 20milliGRAM(s) IV Push every 12 hours PRN Dyspepsia      LABS:                        11.3   6.9   )-----------( 228      ( 15 Leonid 2017 03:00 )             34.0     06-15    139  |  106  |  14  ----------------------------<  96  4.2   |  24  |  0.81    Ca    8.0<L>      15 Leonid 2017 03:00  Phos  3.8     06-15  Mg     2.0     06-15    TPro  7.5  /  Alb  4.4  /  TBili  0.3  /  DBili  x   /  AST  18  /  ALT  21  /  AlkPhos  53  06-14    PT/INR - ( 15 Leonid 2017 03:00 )   PT: 12.0 sec;   INR: 1.10 ratio         PTT - ( 15 Leonid 2017 03:00 )  PTT:30.1 sec  Urinalysis Basic - ( 2017 01:53 )    Color: PL Yellow / Appearance: Clear / S.014 / pH: x  Gluc: x / Ketone: Negative  / Bili: Negative / Urobili: Negative   Blood: x / Protein: Negative / Nitrite: Negative   Leuk Esterase: Negative / RBC: x / WBC x   Sq Epi: x / Non Sq Epi: x / Bacteria: x            Physical exam  General NAD  Abdomen soft nontender, incision c/d/i steristrips in place     A/P: 36y Female  s/p umbilical hernia repair with mesh   - Diet: as tolerated   - Activity- OOB ambulate   - Pain medication as needed   - DVT ppx  - incentive spiromtry   -possible d/c in pm     Bárbara MUÑOZ  greensx [phone]

## 2017-06-15 NOTE — DISCHARGE NOTE ADULT - HOSPITAL COURSE
36F with no PMH presented to ER with 2 weeks of worsening abdominal pain. Pain is periumbilical and radiates outward to all four quadrants. Pain is rated a 5/10. Pain at first was dull, but has now progressed to her feeling like she was "punched in the abdomen." She has been taking Advil for the pain. No fevers/chills, nausea, vomiting, diarrhea, constipation, melena, BRBPR. Pt was found to have an incarcerated, fat containing umbilical hernia. She attempted a PO challenge in the ER, but the pain returned. She requested to have her surgery taken care of now because she does not want to have these pains while in [place] (her trip is in 1.5 weeks). +flatus, +BM. ROS otherwise negative.     CT A/P revealed Tiny fat-containing umbilical hernia with possible incarceration. There is no involvement of the bowel.No appendicitis, colitis or bowel obstruction.    Patient underwent an open small umbilical herna repair with mesh. The patient tolerated the procedure well. There were no complications. The patient was extubated in the OR.  The patient was transferred to the PACU in stable condition.     Pain was controlled. Diet was advanced as tolerated.     At the time of discharge, the patient was hemodynamically stable, was tolerating PO diet, was voiding urine, was ambulating, and was comfortable with adequate pain control. The patient was instructed to follow up with Dr. Roman within 1-2 weeks after discharge from the hospital. The patient felt comfortable with discharge. The patient was discharged to home. The patient had no other issues. 36F with no PMH presented to ER with 2 weeks of worsening abdominal pain. Pain is periumbilical and radiates outward to all four quadrants. Pain is rated a 5/10. Pain at first was dull, but has now progressed to her feeling like she was "punched in the abdomen." She has been taking Advil for the pain. No fevers/chills, nausea, vomiting, diarrhea, constipation, melena, BRBPR. Pt was found to have an incarcerated, fat containing umbilical hernia. She attempted a PO challenge in the ER, but the pain returned. She requested to have her surgery taken care of now because she does not want to have these pains while in [place] (her trip is in 1.5 weeks). +flatus, +BM. ROS otherwise negative.     CT A/P revealed Tiny fat-containing umbilical hernia with possible incarceration. There is no involvement of the bowel.No appendicitis, colitis or bowel obstruction.    Patient underwent an open small umbilical herna repair with mesh. The patient tolerated the procedure well. There were no complications. The patient was extubated in the OR.  The patient was transferred to the PACU in stable condition.     Pain was controlled. Diet was advanced as tolerated. Pt c/o blurred vision post-operatively. Ophthalmology was consulted and pt was found to have presbyopia likely to improve with rest. Will f/u for repeat eye exam in 1-2 weeks.     At the time of discharge, the patient was hemodynamically stable, was tolerating PO diet, was voiding urine, was ambulating, and was comfortable with adequate pain control. The patient was instructed to follow up with Dr. Roman within 1-2 weeks after discharge from the hospital. The patient felt comfortable with discharge. The patient was discharged to home. The patient had no other issues.

## 2017-06-15 NOTE — DISCHARGE NOTE ADULT - CARE PROVIDER_API CALL
Laureano Roman), Surgery  [address] [facility] [place] [address]  Phone: [phone]  Fax: [phone] Laureano Roman (MD), Surgery  [address] [place] [address]  Phone: [phone]  Fax: [phone]    Silvano Pacheco), Ophthalmology  [address] [place] [address]  Phone: [phone]  Fax: [phone]

## 2017-06-15 NOTE — H&P ADULT - NSHPSOCIALHISTORY_GEN_ALL_CORE
She lives with  and 2 kids. Former smoker (15 years, 1 ppd = 15 pack year) - quit 6 years ago. Drinks alcohol "occasionally." No drug use.

## 2017-06-15 NOTE — CONSULT NOTE ADULT - SUBJECTIVE AND OBJECTIVE BOX
HPI:  36F with no PMH presented to hospital with hernia. After the surgery she had impaired vision in both eyes. Patient states that she her far vision is fine but her near vision is really impaired and she has to blow up things on her phone to see them. No double vision. No weakness          MEDICATIONS  (STANDING):  enoxaparin Injectable 40milliGRAM(s) SubCutaneous daily  docusate sodium 100milliGRAM(s) Oral three times a day  sodium chloride 0.9% lock flush 3milliLiter(s) IV Push every 8 hours  bisacodyl 5milliGRAM(s) Oral at bedtime    MEDICATIONS  (PRN):  ondansetron Injectable 4milliGRAM(s) IV Push every 6 hours PRN Nausea  aluminum hydroxide/magnesium hydroxide/simethicone Suspension 30milliLiter(s) Oral every 4 hours PRN Dyspepsia  famotidine Injectable 20milliGRAM(s) IV Push every 12 hours PRN Dyspepsia  acetaminophen   Tablet. 650milliGRAM(s) Oral every 6 hours PRN Mild Pain (1 - 3)  ibuprofen  Tablet 400milliGRAM(s) Oral every 6 hours PRN moderate pain  oxyCODONE IR 5milliGRAM(s) Oral every 4 hours PRN Severe Pain (7 - 10)  bisacodyl Suppository 10milliGRAM(s) Rectal daily PRN Constipation    PAST MEDICAL & SURGICAL HISTORY:  No pertinent past medical history  H/O abdominoplasty  H/O unilateral salpingectomy: left side after ruptured ectopic pregnancy  S/P bunionectomy    FAMILY HISTORY:  Family history of breast cancer (Aunt)  Family history of pancreatic cancer (Father)  No pertinent family history in first degree relatives    Allergies    No Known Allergies    Intolerances        SHx - No smoking, No ETOH, No drug abuse      Review of Systems:  CONSTITUTIONAL:  No weight loss, fever, chills, weakness or fatigue.  HEENT:  Eyes:  No visual loss, blurred vision, double vision or yellow sclerae. Ears, Nose, Throat:  No hearing loss, sneezing, congestion, runny nose or sore throat.  SKIN:  No rash or itching.  CARDIOVASCULAR:  No chest pain, chest pressure or chest discomfort. No palpitations or edema.  RESPIRATORY:  No shortness of breath, cough or sputum.  GASTROINTESTINAL:  No anorexia, nausea, vomiting or diarrhea. No abdominal pain or blood.  GENITOURINARY:  NO Burning on urination.   NEUROLOGICAL: See HPI  MUSCULOSKELETAL:  No muscle, back pain, joint pain or stiffness.  HEMATOLOGIC:  No anemia, bleeding or bruising.  LYMPHATICS:  No enlarged nodes. No history of splenectomy.  PSYCHIATRIC:  No history of depression or anxiety.  ENDOCRINOLOGIC:  No reports of sweating, cold or heat intolerance. No polyuria or polydipsia.  ALLERGIES:  No history of asthma, hives, eczema or rhinitis.        Vital Signs Last 24 Hrs  T(C): 36.4, Max: 36.8 (06-15 @ 09:55)  T(F): 97.6, Max: 98.2 (06-15 @ 09:55)  HR: 72 (52 - 98)  BP: 102/59 (91/60 - 113/54)  BP(mean): 72 (72 - 81)  RR: 18 (16 - 18)  SpO2: 96% (96% - 100%)    General Exam:   General appearance: No acute distress                   Neurological Exam:  Mental Status: Orientated to self, date and place.  Attention intact.  No dysarthria, aphasia or neglect.  Knowledge intact.  Registration intact.  Short and long term memory grossly intact.      Cranial Nerves: CN I - not tested.  PERRL pupils 5  and and reactive, 20/25 bilaterally but impaired reading EOMI, VFF, no nystagmus or diplopia.  No APD.  Fundi not visualized bilaterally.  CN V1-3 intact to light touch and pinprick.  No facial asymmetry.  Hearing intact to finger rub bilaterally.  Tongue, uvula and palate midline.  Sternocleidomastoid and Trapezius intact bilaterally.    Motor:   Tone: normal.                  Strength:     [] Upper extremity                      Delt       Bicep    Tricep                                                  R         5/5        5/5        5/5       5/5                                               L          5/5 5/5        5/5       5/5  [] Lower extremity                       HF          KE          KF        DF         PF                                               R        5/5 5/5 5/5 5/5       5/5                                               L         5/5 5/5       5/5       5/5        5/5  Pronator drift: none                 Dysmetria: None to finger-nose-finger or heel-shin-heel  No truncal ataxia.    Tremor: No resting, postural or action tremor.  No myoclonus.    Sensation: intact to light touch, pinprick, vibration and proprioception    Deep Tendon Reflexes: 1+ bilateral biceps, triceps, brachioradialis, knee and ankle  Toes flexor bilaterally    Gait: normal and stable.      Other:    06-15    139  |  106  |  14  ----------------------------<  96  4.2   |  24  |  0.81    Ca    8.0<L>      15 Leonid 2017 03:00  Phos  3.8     06-15  Mg     2.0     06-15    TPro  7.5  /  Alb  4.4  /  TBili  0.3  /  DBili  x   /  AST  18  /  ALT  21  /  AlkPhos  53  06-14    06-15    139  |  106  |  14  ----------------------------<  96  4.2   |  24  |  0.81    Ca    8.0<L>      15 Leonid 2017 03:00  Phos  3.8     06-15  Mg     2.0     06-15    TPro  7.5  /  Alb  4.4  /  TBili  0.3  /  DBili  x   /  AST  18  /  ALT  21  /  AlkPhos  53  06-14                          11.3   6.9   )-----------( 228      ( 15 Leonid 2017 03:00 )             34.0

## 2017-06-15 NOTE — H&P ADULT - HISTORY OF PRESENT ILLNESS
36F with no PMH presented to ER with 2 weeks of worsening abdominal pain. Pain is periumbilical and radiates outward to all four quadrants. Pain is rated a 5/10. Pain at first was dull, but has now progressed to her feeling like she was "punched in the abdomen." She has been taking Advil for the pain. No fevers/chills, nausea, vomiting, diarrhea, constipation, melena, BRBPR. Pt was found to have an incarcerated, fat containing umbilical hernia. She attempted a PO challenge in the ER, but the pain returned. She requested to have her surgery taken care of now because she does not want to have these pains while in [place] (her trip is in 1.5 weeks). ROS otherwise negative. 36F with no PMH presented to ER with 2 weeks of worsening abdominal pain. Pain is periumbilical and radiates outward to all four quadrants. Pain is rated a 5/10. Pain at first was dull, but has now progressed to her feeling like she was "punched in the abdomen." She has been taking Advil for the pain. No fevers/chills, nausea, vomiting, diarrhea, constipation, melena, BRBPR. Pt was found to have an incarcerated, fat containing umbilical hernia. She attempted a PO challenge in the ER, but the pain returned. She requested to have her surgery taken care of now because she does not want to have these pains while in [place] (her trip is in 1.5 weeks). +flatus, +BM. ROS otherwise negative.

## 2017-06-15 NOTE — H&P ADULT - PSH
H/O abdominoplasty    H/O unilateral salpingectomy  left side after ruptured ectopic pregnancy  S/P bunionectomy

## 2017-06-15 NOTE — CONSULT NOTE ADULT - ATTENDING COMMENTS
Above note reviewed however I was not able to physically see patient or examine them as they were discharged form hospital prior to seeing them.

## 2017-06-15 NOTE — H&P ADULT - NO PERTINENT FAMILY HISTORY
Tell the patient's grandmother that I agree with Dr. Bruno that the treatments we have been providing to her are not helping, and in fact might be making things worse. There is a strong psychological component to a lot of her symptoms, this does need to be dealt with more aggressively. In the meantime, the infusions will stop. JS    E-mail sent to pt's Grandmother. HU   <<----- Click to add NO pertinent Family History

## 2017-06-15 NOTE — DISCHARGE NOTE ADULT - MEDICATION SUMMARY - MEDICATIONS TO TAKE
I will START or STAY ON the medications listed below when I get home from the hospital:    acetaminophen 325 mg oral tablet  -- 2 tab(s) by mouth every 6 hours, As needed, Mild Pain (1 - 3)  -- Indication: For pain    ibuprofen 400 mg oral tablet  -- 1 tab(s) by mouth every 6 hours, As needed, moderate pain  -- Indication: For pain    oxyCODONE 5 mg oral tablet  -- 1 tab(s) by mouth every 4 hours, As Needed -for severe pain MDD:6 tabs  -- Caution federal law prohibits the transfer of this drug to any person other  than the person for whom it was prescribed.  It is very important that you take or use this exactly as directed.  Do not skip doses or discontinue unless directed by your doctor.  May cause drowsiness.  Alcohol may intensify this effect.  Use care when operating dangerous machinery.  This prescription cannot be refilled.  Using more of this medication than prescribed may cause serious breathing problems.    -- Indication: For pain    docusate sodium 100 mg oral capsule  -- 1 cap(s) by mouth 3 times a day  -- Indication: For constipation    bisacodyl 5 mg oral delayed release tablet  -- 1 tab(s) by mouth once a day (at bedtime)  -- Indication: For constipation

## 2017-06-15 NOTE — CONSULT NOTE ADULT - SUBJECTIVE AND OBJECTIVE BOX
36 F s/p umbilical hernia repair earlier today, c/o blurry vision and difficulty focusing beginning after the surgery, distance appears clear but small font on phone is unclear.  No pain, no photophobia.     PMH: none  Mes: lovenox, tylenol, famotidine, ibuprophen, maalox, dulcolax, colace, oxycodone IR, zofran  POH: no trauma, laser or surgery; no glasses  Fam Hx: noncontributory  NKDA    VAsc(near) 20/70+2, PH 20/20, 20/70-2 PH 20/25  P 5-3 ou no APD  T 15/14  EOM full ou   CVF full ou     PLE:   LLA: flat ou   C/S: W&Q ou   K: clear ou   AC: D&F ou   I flat ou   L clear ou     DFE @ 9:10pm w/ T&P  ON:   M/V/P: 36 F s/p umbilical hernia repair earlier today, c/o blurry vision and difficulty focusing beginning after the surgery, distance appears clear but small font on phone is unclear.  No pain, no photophobia.     PMH: none  Mes: lovenox, tylenol, famotidine, ibuprophen, maalox, dulcolax, colace, oxycodone IR, zofran  POH: no trauma, laser or surgery; no glasses  Fam Hx: noncontributory  NKDA    VAsc(near) 20/70+2, PH 20/20, 20/70-2 PH 20/25  P 5-3 ou no APD  T 15/14  EOM full ou   CVF full ou     PLE:   LLA: flat ou   C/S: W&Q ou   K: clear ou   AC: D&F ou   I flat ou   L clear ou     DFE @ 9:10pm w/ T&P  ON: S&P ou, C/D 0.25 ou   M/V/P: flat ou

## 2017-06-15 NOTE — H&P ADULT - ATTENDING COMMENTS
Pt seen and examined  Agree with note which was reviewed and edited where appropriate.  D/W patient, RN, residents and Fellow    Risks, benefits and alternatives discussed at length.  The patient appears to understand and wishes to proceed.  All questions answered.

## 2017-06-16 VITALS
HEART RATE: 72 BPM | DIASTOLIC BLOOD PRESSURE: 66 MMHG | SYSTOLIC BLOOD PRESSURE: 103 MMHG | OXYGEN SATURATION: 97 % | TEMPERATURE: 98 F | RESPIRATION RATE: 18 BRPM

## 2017-06-20 ENCOUNTER — APPOINTMENT (OUTPATIENT)
Dept: SURGERY | Facility: CLINIC | Age: 37
End: 2017-06-20

## 2017-06-20 VITALS
SYSTOLIC BLOOD PRESSURE: 108 MMHG | HEART RATE: 63 BPM | OXYGEN SATURATION: 98 % | TEMPERATURE: 98.2 F | DIASTOLIC BLOOD PRESSURE: 73 MMHG | RESPIRATION RATE: 16 BRPM

## 2017-06-20 DIAGNOSIS — K42.9 UMBILICAL HERNIA W/OUT OBSTRUCTION OR GANGRENE: ICD-10-CM

## 2017-06-21 LAB — SURGICAL PATHOLOGY STUDY: SIGNIFICANT CHANGE UP

## 2017-10-02 ENCOUNTER — TRANSCRIPTION ENCOUNTER (OUTPATIENT)
Age: 37
End: 2017-10-02

## 2017-10-26 ENCOUNTER — NON-APPOINTMENT (OUTPATIENT)
Age: 37
End: 2017-10-26

## 2017-10-26 ENCOUNTER — APPOINTMENT (OUTPATIENT)
Dept: CARDIOLOGY | Facility: CLINIC | Age: 37
End: 2017-10-26
Payer: COMMERCIAL

## 2017-10-26 VITALS
DIASTOLIC BLOOD PRESSURE: 70 MMHG | SYSTOLIC BLOOD PRESSURE: 106 MMHG | HEIGHT: 66 IN | HEART RATE: 71 BPM | BODY MASS INDEX: 25.23 KG/M2 | OXYGEN SATURATION: 95 % | WEIGHT: 157 LBS

## 2017-10-26 DIAGNOSIS — R10.816 EPIGASTRIC ABDOMINAL TENDERNESS: ICD-10-CM

## 2017-10-26 DIAGNOSIS — R63.5 ABNORMAL WEIGHT GAIN: ICD-10-CM

## 2017-10-26 PROCEDURE — 93000 ELECTROCARDIOGRAM COMPLETE: CPT

## 2017-10-26 PROCEDURE — 99204 OFFICE O/P NEW MOD 45 MIN: CPT

## 2017-10-26 RX ORDER — KETOCONAZOLE 20.5 MG/ML
2 SHAMPOO, SUSPENSION TOPICAL
Qty: 120 | Refills: 0 | Status: COMPLETED | COMMUNITY
Start: 2017-06-07

## 2017-10-26 RX ORDER — METRONIDAZOLE 7.5 MG/G
0.75 CREAM TOPICAL
Qty: 45 | Refills: 0 | Status: COMPLETED | COMMUNITY
Start: 2017-06-07

## 2017-10-26 RX ORDER — CLINDAMYCIN PHOSPHATE 10 MG/ML
1 LOTION TOPICAL
Qty: 60 | Refills: 0 | Status: COMPLETED | COMMUNITY
Start: 2017-06-07

## 2017-10-26 RX ORDER — AZITHROMYCIN 500 MG/1
500 TABLET, FILM COATED ORAL
Qty: 5 | Refills: 0 | Status: COMPLETED | COMMUNITY
Start: 2017-06-04

## 2017-10-27 LAB
ALBUMIN SERPL ELPH-MCNC: 4 G/DL
ALP BLD-CCNC: 48 U/L
ALT SERPL-CCNC: 20 U/L
ANION GAP SERPL CALC-SCNC: 16 MMOL/L
AST SERPL-CCNC: 18 U/L
BASOPHILS # BLD AUTO: 0.02 K/UL
BASOPHILS NFR BLD AUTO: 0.4 %
BILIRUB SERPL-MCNC: 0.2 MG/DL
BUN SERPL-MCNC: 21 MG/DL
CALCIUM SERPL-MCNC: 9.3 MG/DL
CHLORIDE SERPL-SCNC: 106 MMOL/L
CHOLEST SERPL-MCNC: 234 MG/DL
CHOLEST/HDLC SERPL: 5.1 RATIO
CO2 SERPL-SCNC: 21 MMOL/L
CREAT SERPL-MCNC: 0.81 MG/DL
EOSINOPHIL # BLD AUTO: 0.15 K/UL
EOSINOPHIL NFR BLD AUTO: 2.7 %
ESTIMATED AVERAGE GLUCOSE: 105 MG/DL
GLUCOSE SERPL-MCNC: 97 MG/DL
HBA1C MFR BLD HPLC: 5.3 %
HCT VFR BLD CALC: 38.8 %
HDLC SERPL-MCNC: 46 MG/DL
HGB BLD-MCNC: 13 G/DL
IMM GRANULOCYTES NFR BLD AUTO: 0.2 %
LDLC SERPL CALC-MCNC: 127 MG/DL
LYMPHOCYTES # BLD AUTO: 1.83 K/UL
LYMPHOCYTES NFR BLD AUTO: 33.2 %
MAN DIFF?: NORMAL
MCHC RBC-ENTMCNC: 29.9 PG
MCHC RBC-ENTMCNC: 33.5 GM/DL
MCV RBC AUTO: 89.2 FL
MONOCYTES # BLD AUTO: 0.41 K/UL
MONOCYTES NFR BLD AUTO: 7.4 %
NEUTROPHILS # BLD AUTO: 3.1 K/UL
NEUTROPHILS NFR BLD AUTO: 56.1 %
PLATELET # BLD AUTO: 233 K/UL
POTASSIUM SERPL-SCNC: 4.6 MMOL/L
PROT SERPL-MCNC: 6.9 G/DL
RBC # BLD: 4.35 M/UL
RBC # FLD: 13.9 %
SODIUM SERPL-SCNC: 143 MMOL/L
T4 SERPL-MCNC: 5.1 UG/DL
TRIGL SERPL-MCNC: 305 MG/DL
TSH SERPL-ACNC: 1.9 UIU/ML
WBC # FLD AUTO: 5.52 K/UL

## 2017-11-22 ENCOUNTER — OTHER (OUTPATIENT)
Age: 37
End: 2017-11-22

## 2017-11-22 DIAGNOSIS — N60.09 SOLITARY CYST OF UNSPECIFIED BREAST: ICD-10-CM

## 2018-05-06 ENCOUNTER — TRANSCRIPTION ENCOUNTER (OUTPATIENT)
Age: 38
End: 2018-05-06

## 2018-09-17 NOTE — DISCHARGE NOTE ADULT - PATIENT PORTAL LINK FT
“You can access the FollowHealth Patient Portal, offered by [facility], by registering with the following website: [email or web address]”
Yes...

## 2019-10-24 ENCOUNTER — TRANSCRIPTION ENCOUNTER (OUTPATIENT)
Age: 39
End: 2019-10-24

## 2019-10-31 ENCOUNTER — APPOINTMENT (OUTPATIENT)
Dept: PULMONOLOGY | Facility: CLINIC | Age: 39
End: 2019-10-31

## 2019-10-31 ENCOUNTER — APPOINTMENT (OUTPATIENT)
Dept: PULMONOLOGY | Facility: CLINIC | Age: 39
End: 2019-10-31
Payer: COMMERCIAL

## 2019-10-31 VITALS
DIASTOLIC BLOOD PRESSURE: 60 MMHG | WEIGHT: 162 LBS | RESPIRATION RATE: 17 BRPM | HEIGHT: 66 IN | SYSTOLIC BLOOD PRESSURE: 110 MMHG | BODY MASS INDEX: 26.03 KG/M2 | HEART RATE: 76 BPM | OXYGEN SATURATION: 98 %

## 2019-10-31 DIAGNOSIS — Z83.3 FAMILY HISTORY OF DIABETES MELLITUS: ICD-10-CM

## 2019-10-31 DIAGNOSIS — Z86.39 PERSONAL HISTORY OF OTHER ENDOCRINE, NUTRITIONAL AND METABOLIC DISEASE: ICD-10-CM

## 2019-10-31 DIAGNOSIS — Z82.61 FAMILY HISTORY OF ARTHRITIS: ICD-10-CM

## 2019-10-31 DIAGNOSIS — E78.2 MIXED HYPERLIPIDEMIA: ICD-10-CM

## 2019-10-31 DIAGNOSIS — Z87.891 PERSONAL HISTORY OF NICOTINE DEPENDENCE: ICD-10-CM

## 2019-10-31 DIAGNOSIS — F50.2 BULIMIA NERVOSA: ICD-10-CM

## 2019-10-31 DIAGNOSIS — K59.09 OTHER CONSTIPATION: ICD-10-CM

## 2019-10-31 PROCEDURE — 99204 OFFICE O/P NEW MOD 45 MIN: CPT | Mod: 25

## 2019-10-31 PROCEDURE — 94727 GAS DIL/WSHOT DETER LNG VOL: CPT

## 2019-10-31 PROCEDURE — 94729 DIFFUSING CAPACITY: CPT

## 2019-10-31 PROCEDURE — 94618 PULMONARY STRESS TESTING: CPT

## 2019-10-31 PROCEDURE — 94060 EVALUATION OF WHEEZING: CPT

## 2019-10-31 PROCEDURE — ZZZZZ: CPT

## 2019-10-31 RX ORDER — THYROID, PORCINE 30 MG/1
30 TABLET ORAL
Refills: 0 | Status: ACTIVE | COMMUNITY

## 2019-10-31 RX ORDER — BUPROPION HYDROCHLORIDE 300 MG/1
300 TABLET, EXTENDED RELEASE ORAL
Refills: 0 | Status: ACTIVE | COMMUNITY

## 2019-10-31 NOTE — REASON FOR VISIT
[Initial Evaluation] : an initial evaluation [FreeTextEntry1] :  originally from [place], former heavy smoker, hx of gut  psoriasis, and seborrheic dermatitis, hypothyroidism

## 2019-10-31 NOTE — ADDENDUM
[FreeTextEntry1] : Documented by Jose Angel Maharaj acting as a scribe for Dr. Kurtis Christine on 10/31/2019 \par \par All medical record entries made by the Scribe were at my, Dr. Kurtis Christine's, direction and personally dictated by me on 10/31/2019 . I have reviewed the chart and agree that the record accurately reflects my personal performance of the history, physical exam, assessment and plan. I have also personally directed, reviewed, and agree with the discharge instructions.\par

## 2019-10-31 NOTE — ASSESSMENT
[FreeTextEntry1] : Ms. BAEZ is a 39 year old female with a history of  originally from [place], former heavy smoker, hx of gut psoriasis, and seborrheic dermatitis, hypothyroidism who now comes to the office for an initial pulmonary\par evaluation.\par Her shortness of breath is multifactorial due to:\par -poor mechanics of breathing\par -out of shape / overweight\par -pulmonary disease\par \par problem 1: Pulmonary disease (less likely PE)\par -  Possibly asthma\par - Bloodwork D-dimer/ ESR\par - Bloodwork  total allergy profile \par -Recommend  Breo Ellipta 200 at 1 inhalation QHS QD\par - Methacholine challenge \par - Recommend to use ProAir \par \par problem 2 : cardiac disease doubtful? \par \par problem 3: Constipation\par Recommend to use Dr. Loyd's\par  \par Problem 4:  Poor sleep pattern\par Recommend to use a home sleep study \par - Currently reports to Dr. Sky \par \par Problem 5: Former Smoker \par Does not qualify for lung cancer screening \par \par \par problem  6: health maintenance\par -recommended yearly flu shot after October 15\par -recommended strep pneumonia vaccines: Prevnar-13 vaccine, followed by Pneumo vaccine 23 one year\par following\par -recommended early intervention for Upper Respiratory Infections (URIs)\par -recommended regular osteoporosis evaluations\par -recommended early dermatological evaluations\par -recommended after the age of 50 to the age of 70, colonoscopy every 5 years\par \par F/U in 6-8 weeks.\par She is encouraged to call with any changes, concerns, or questions\par

## 2019-10-31 NOTE — PROCEDURE
[FreeTextEntry1] : CXR 10/24/2019:\par Reveals a normal sized heart; no evidence of infiltrate or effusion--a normal appearing chest radiograph. \par \par 6 minute walk test reveals a low saturation of 98% with slight to moderate evidence of dyspnea or fatigue; walked 733.5 meters\par \par Full PFT revealed normal flows, with a FEV1 of  3.18L, which is 107% of predicted, normal lung volumes, and a  normal diffusion of 22.2, which is 92% of predicted, with a normal flow volume loop

## 2019-10-31 NOTE — PHYSICAL EXAM
[General Appearance - Well Developed] : well developed [Normal Appearance] : normal appearance [Well Groomed] : well groomed [General Appearance - Well Nourished] : well nourished [No Deformities] : no deformities [General Appearance - In No Acute Distress] : no acute distress [Normal Conjunctiva] : the conjunctiva exhibited no abnormalities [Eyelids - No Xanthelasma] : the eyelids demonstrated no xanthelasmas [Normal Oropharynx] : normal oropharynx [Neck Appearance] : the appearance of the neck was normal [Neck Cervical Mass (___cm)] : no neck mass was observed [Jugular Venous Distention Increased] : there was no jugular-venous distention [Thyroid Diffuse Enlargement] : the thyroid was not enlarged [Thyroid Nodule] : there were no palpable thyroid nodules [Heart Rate And Rhythm] : heart rate and rhythm were normal [Heart Sounds] : normal S1 and S2 [Murmurs] : no murmurs present [Respiration, Rhythm And Depth] : normal respiratory rhythm and effort [Exaggerated Use Of Accessory Muscles For Inspiration] : no accessory muscle use [Auscultation Breath Sounds / Voice Sounds] : lungs were clear to auscultation bilaterally [Abdomen Soft] : soft [Abdomen Tenderness] : non-tender [Abdomen Mass (___ Cm)] : no abdominal mass palpated [Abnormal Walk] : normal gait [Gait - Sufficient For Exercise Testing] : the gait was sufficient for exercise testing [Nail Clubbing] : no clubbing of the fingernails [Cyanosis, Localized] : no localized cyanosis [Petechial Hemorrhages (___cm)] : no petechial hemorrhages [Skin Color & Pigmentation] : normal skin color and pigmentation [Skin Turgor] : normal skin turgor [] : no rash [Deep Tendon Reflexes (DTR)] : deep tendon reflexes were 2+ and symmetric [Sensation] : the sensory exam was normal to light touch and pinprick [No Focal Deficits] : no focal deficits [Oriented To Time, Place, And Person] : oriented to person, place, and time [Impaired Insight] : insight and judgment were intact [Affect] : the affect was normal [III] : III [FreeTextEntry1] : I:E ratio 1:3; clear

## 2019-10-31 NOTE — HISTORY OF PRESENT ILLNESS
[FreeTextEntry1] : Ms. BAEZ is a 39 year old female with a history of  originally from [place], former heavy smoker, hx of gut  psoriasis, and seborrheic dermatitis, hypothyroidism who now comes to the office for an initial pulmonary\par evaluation.\par - Beginning of September, she states she was having difficulty breathing. She had a breathing test and echo which were fine. \par - She stats that it was a little bit better but then it came back. \par - She states that this was probably because of exercising too much causing inflammation\par She states that her whole upper body was hurting/aches. \par - Denies back pain. \par - Patient tried ProAir but did not work \par - Walk-in clinic did additional scans which were fine\par - She states she is feeling okay and this is the first time experiencing this\par - She states she used to have wheezing when it was more prominent \par - She denies wheezing \par - Chronic constipation \par - Denies post nasal drip \par - Constant discomfort felt in shoulders \par - No trouble with memory and if she is tired, she can fall asleep as a passenger in the car and can fall asleep watching tv. \par - Gets up at night once or twice to use bathroom \par - Denies anxiety \par \par \par denies any headaches, nausea, vomiting, fever, chills, sweats, chest pain, chest pressure, diarrhea,  dysphagia, dizziness, leg swelling, leg pain, itchy eyes, itchy ears, heartburn, reflux, or sour taste in the mouth.\par

## 2019-11-05 ENCOUNTER — RX CHANGE (OUTPATIENT)
Age: 39
End: 2019-11-05

## 2019-11-05 RX ORDER — FLUTICASONE PROPIONATE AND SALMETEROL 250; 50 UG/1; UG/1
250-50 POWDER RESPIRATORY (INHALATION)
Qty: 3 | Refills: 1 | Status: ACTIVE | COMMUNITY
Start: 2019-11-05 | End: 1900-01-01

## 2019-11-05 RX ORDER — FLUTICASONE FUROATE AND VILANTEROL TRIFENATATE 200; 25 UG/1; UG/1
200-25 POWDER RESPIRATORY (INHALATION) DAILY
Qty: 1 | Refills: 3 | Status: DISCONTINUED | COMMUNITY
Start: 2019-10-31 | End: 2019-11-05

## 2019-11-11 ENCOUNTER — LABORATORY RESULT (OUTPATIENT)
Age: 39
End: 2019-11-11

## 2019-11-12 LAB
24R-OH-CALCIDIOL SERPL-MCNC: 46.8 PG/ML
25(OH)D3 SERPL-MCNC: 52.6 NG/ML
BASOPHILS # BLD AUTO: 0.06 K/UL
BASOPHILS NFR BLD AUTO: 1.2 %
DEPRECATED D DIMER PPP IA-ACNC: <150 NG/ML DDU
EOSINOPHIL # BLD AUTO: 0.11 K/UL
EOSINOPHIL NFR BLD AUTO: 2.2 %
ERYTHROCYTE [SEDIMENTATION RATE] IN BLOOD BY WESTERGREN METHOD: 15 MM/HR
HCT VFR BLD CALC: 40.9 %
HGB BLD-MCNC: 13.5 G/DL
IMM GRANULOCYTES NFR BLD AUTO: 0.2 %
LYMPHOCYTES # BLD AUTO: 2.21 K/UL
LYMPHOCYTES NFR BLD AUTO: 44 %
MAN DIFF?: NORMAL
MCHC RBC-ENTMCNC: 29.9 PG
MCHC RBC-ENTMCNC: 33 GM/DL
MCV RBC AUTO: 90.7 FL
MONOCYTES # BLD AUTO: 0.53 K/UL
MONOCYTES NFR BLD AUTO: 10.6 %
NEUTROPHILS # BLD AUTO: 2.1 K/UL
NEUTROPHILS NFR BLD AUTO: 41.8 %
PLATELET # BLD AUTO: 233 K/UL
RBC # BLD: 4.51 M/UL
RBC # FLD: 12.7 %
WBC # FLD AUTO: 5.02 K/UL

## 2019-11-13 LAB
A ALTERNATA IGE QN: <0.1 KUA/L
A FUMIGATUS IGE QN: <0.1 KUA/L
C ALBICANS IGE QN: <0.1 KUA/L
C HERBARUM IGE QN: <0.1 KUA/L
CAT DANDER IGE QN: <0.1 KUA/L
CLAM IGE QN: <0.1 KUA/L
CODFISH IGE QN: <0.1 KUA/L
COMMON RAGWEED IGE QN: <0.1 KUA/L
CORN IGE QN: <0.1 KUA/L
COW MILK IGE QN: <0.1 KUA/L
D FARINAE IGE QN: 0.13 KUA/L
D PTERONYSS IGE QN: 0.13 KUA/L
DEPRECATED A ALTERNATA IGE RAST QL: 0
DEPRECATED A FUMIGATUS IGE RAST QL: 0
DEPRECATED C ALBICANS IGE RAST QL: 0
DEPRECATED C HERBARUM IGE RAST QL: 0
DEPRECATED CAT DANDER IGE RAST QL: 0
DEPRECATED CLAM IGE RAST QL: 0
DEPRECATED CODFISH IGE RAST QL: 0
DEPRECATED COMMON RAGWEED IGE RAST QL: 0
DEPRECATED CORN IGE RAST QL: 0
DEPRECATED COW MILK IGE RAST QL: 0
DEPRECATED D FARINAE IGE RAST QL: NORMAL
DEPRECATED D PTERONYSS IGE RAST QL: NORMAL
DEPRECATED DOG DANDER IGE RAST QL: 0
DEPRECATED EGG WHITE IGE RAST QL: 0
DEPRECATED M RACEMOSUS IGE RAST QL: 0
DEPRECATED PEANUT IGE RAST QL: 0
DEPRECATED ROACH IGE RAST QL: NORMAL
DEPRECATED SCALLOP IGE RAST QL: 0.12 KUA/L
DEPRECATED SESAME SEED IGE RAST QL: 0
DEPRECATED SHRIMP IGE RAST QL: NORMAL
DEPRECATED SOYBEAN IGE RAST QL: 0
DEPRECATED TIMOTHY IGE RAST QL: 0
DEPRECATED WALNUT IGE RAST QL: 0
DEPRECATED WHEAT IGE RAST QL: 0
DEPRECATED WHITE OAK IGE RAST QL: 0
DOG DANDER IGE QN: <0.1 KUA/L
EGG WHITE IGE QN: <0.1 KUA/L
M RACEMOSUS IGE QN: <0.1 KUA/L
PEANUT IGE QN: <0.1 KUA/L
ROACH IGE QN: 0.18 KUA/L
SCALLOP IGE QN: 0.12 KUA/L
SCALLOP IGE QN: NORMAL
SESAME SEED IGE QN: <0.1 KUA/L
SOYBEAN IGE QN: <0.1 KUA/L
TIMOTHY IGE QN: <0.1 KUA/L
TOTAL IGE SMQN RAST: 37 KU/L
WALNUT IGE QN: <0.1 KUA/L
WHEAT IGE QN: <0.1 KUA/L
WHITE OAK IGE QN: <0.1 KUA/L

## 2019-11-29 ENCOUNTER — APPOINTMENT (OUTPATIENT)
Dept: PULMONOLOGY | Facility: CLINIC | Age: 39
End: 2019-11-29
Payer: COMMERCIAL

## 2019-11-29 PROCEDURE — 94070 EVALUATION OF WHEEZING: CPT

## 2019-11-29 PROCEDURE — 95070 INHLJ BRNCL CHALLENGE TSTG: CPT

## 2019-12-02 ENCOUNTER — APPOINTMENT (OUTPATIENT)
Dept: SLEEP CENTER | Facility: CLINIC | Age: 39
End: 2019-12-02
Payer: COMMERCIAL

## 2019-12-02 ENCOUNTER — OUTPATIENT (OUTPATIENT)
Dept: OUTPATIENT SERVICES | Facility: HOSPITAL | Age: 39
LOS: 1 days | End: 2019-12-02
Payer: COMMERCIAL

## 2019-12-02 DIAGNOSIS — Z98.890 OTHER SPECIFIED POSTPROCEDURAL STATES: Chronic | ICD-10-CM

## 2019-12-02 DIAGNOSIS — Z90.79 ACQUIRED ABSENCE OF OTHER GENITAL ORGAN(S): Chronic | ICD-10-CM

## 2019-12-02 PROCEDURE — 95806 SLEEP STUDY UNATT&RESP EFFT: CPT

## 2019-12-02 PROCEDURE — 95806 SLEEP STUDY UNATT&RESP EFFT: CPT | Mod: 26

## 2019-12-04 ENCOUNTER — APPOINTMENT (OUTPATIENT)
Dept: PULMONOLOGY | Facility: CLINIC | Age: 39
End: 2019-12-04
Payer: COMMERCIAL

## 2019-12-04 ENCOUNTER — NON-APPOINTMENT (OUTPATIENT)
Age: 39
End: 2019-12-04

## 2019-12-04 VITALS
DIASTOLIC BLOOD PRESSURE: 70 MMHG | OXYGEN SATURATION: 98 % | SYSTOLIC BLOOD PRESSURE: 120 MMHG | RESPIRATION RATE: 17 BRPM | HEIGHT: 66 IN | HEART RATE: 64 BPM

## 2019-12-04 PROCEDURE — 94010 BREATHING CAPACITY TEST: CPT

## 2019-12-04 PROCEDURE — 99214 OFFICE O/P EST MOD 30 MIN: CPT | Mod: 25

## 2019-12-04 PROCEDURE — 95012 NITRIC OXIDE EXP GAS DETER: CPT

## 2019-12-04 RX ORDER — MONTELUKAST 10 MG/1
10 TABLET, FILM COATED ORAL
Qty: 1 | Refills: 1 | Status: ACTIVE | COMMUNITY
Start: 2019-12-04 | End: 1900-01-01

## 2019-12-04 RX ORDER — LEVALBUTEROL TARTRATE 45 UG/1
45 AEROSOL, METERED ORAL
Qty: 3 | Refills: 2 | Status: ACTIVE | COMMUNITY
Start: 2019-12-04 | End: 1900-01-01

## 2019-12-04 NOTE — REASON FOR VISIT
[Follow-Up] : a follow-up visit [FreeTextEntry1] : originally from [place], former heavy smoker, hx of gut  psoriasis, and seborrheic dermatitis, asthma, allergy

## 2019-12-04 NOTE — PHYSICAL EXAM
[General Appearance - Well Developed] : well developed [General Appearance - Well Nourished] : well nourished [Normal Appearance] : normal appearance [Well Groomed] : well groomed [General Appearance - In No Acute Distress] : no acute distress [No Deformities] : no deformities [Normal Conjunctiva] : the conjunctiva exhibited no abnormalities [Normal Oropharynx] : normal oropharynx [Eyelids - No Xanthelasma] : the eyelids demonstrated no xanthelasmas [III] : III [Neck Appearance] : the appearance of the neck was normal [Neck Cervical Mass (___cm)] : no neck mass was observed [Thyroid Diffuse Enlargement] : the thyroid was not enlarged [Thyroid Nodule] : there were no palpable thyroid nodules [Jugular Venous Distention Increased] : there was no jugular-venous distention [Heart Rate And Rhythm] : heart rate and rhythm were normal [Murmurs] : no murmurs present [Heart Sounds] : normal S1 and S2 [Auscultation Breath Sounds / Voice Sounds] : lungs were clear to auscultation bilaterally [Respiration, Rhythm And Depth] : normal respiratory rhythm and effort [Exaggerated Use Of Accessory Muscles For Inspiration] : no accessory muscle use [Abdomen Tenderness] : non-tender [Abdomen Soft] : soft [Abdomen Mass (___ Cm)] : no abdominal mass palpated [Gait - Sufficient For Exercise Testing] : the gait was sufficient for exercise testing [Abnormal Walk] : normal gait [Cyanosis, Localized] : no localized cyanosis [Petechial Hemorrhages (___cm)] : no petechial hemorrhages [Nail Clubbing] : no clubbing of the fingernails [No Venous Stasis] : no venous stasis [Skin Lesions] : no skin lesions [No Skin Ulcers] : no skin ulcer [Sensation] : the sensory exam was normal to light touch and pinprick [Deep Tendon Reflexes (DTR)] : deep tendon reflexes were 2+ and symmetric [No Xanthoma] : no  xanthoma was observed [No Focal Deficits] : no focal deficits [Oriented To Time, Place, And Person] : oriented to person, place, and time [Affect] : the affect was normal [Impaired Insight] : insight and judgment were intact [Skin Color & Pigmentation] : normal skin color and pigmentation [] : no rash [Skin Turgor] : normal skin turgor [FreeTextEntry1] : I:E ratio 1:3; clear

## 2019-12-04 NOTE — PROCEDURE
[FreeTextEntry1] : PFT revealed normal flows, with a FEV1 of 3.62L, which is 133% of predicted, with a normal flow volume loop \par \par FENO was 15; a normal value being less than 25\par Fractional exhaled nitric oxide (FENO) is regarded as a simple, noninvasive method for assessing eosinophilic airway inflammation. Produced by a variety of cells within the lung, nitric oxide (NO) concentrations are generally low in healthy individuals. However, high concentrations of NO appear to be involved in nonspecific host defense mechanisms and chronic inflammatory diseases such as asthma. The American Thoracic Society (ATS) therefore has recommended using FENO to aid in the diagnosis and monitoring of eosinophilic airway inflammation and asthma, and for identifying steroid responsive individuals whose chronic respiratory symptoms may be caused by airway inflammation. \par \par MCT (11.29.19) reveals a 23% decrease in FEV1 at level 6.

## 2019-12-04 NOTE — REVIEW OF SYSTEMS
[Chest Tightness] : chest tightness [Dyspnea] : dyspnea [As Noted in HPI] : as noted in HPI [Negative] : Sleep Disorder [Heartburn] : no heartburn [Chest Discomfort] : no chest discomfort [Reflux] : no reflux [Dysphagia] : no dysphagia [Constipation] : no constipation [Diarrhea] : no diarrhea

## 2019-12-04 NOTE — HISTORY OF PRESENT ILLNESS
[FreeTextEntry1] : Ms. BAEZ is a 39 year old female with a history of  originally from [place], former heavy smoker, hx of gut  psoriasis, and seborrheic dermatitis, hypothyroidism who now comes to the office for a follow up pulmonary evaluation. Her number one issue is asthma\par -she reports she has been feeling odd\par -she notes her inhaler didn't help her breathing\par -she is s/p the MCT, which revealed mild asthma\par -she is unsure what her asthma triggers are. She notes she can run for 7 miles one day, and another unable to breathe\par -she notes cold air, exercise, food, specific activities, air conditioning, carpets, all don't cause her asthma to activate\par -She notes Her bowels are regular, with use of Dr. Gilliam's formula\par -she denies taking any new medications, vitamins, or supplements, except biotin for hair loss\par -she notes she stays hydrated\par -she reports she is sleeping well, but wakes up a few times per night, and sometimes doesn’t feel rested\par -she notes her vision is good\par -she notes she was returning from skiing yesterday, and had active asthma on the car ride home\par -she notices her asthma is active in the evening, when she is in the car or when she is sitting\par -she notes she moves around a lot in bed, as she likes changing positions at night\par -she describes her symptoms as chest tightness, back tightness\par -she notes she had her sleep study done last night\par -she denies any chest pain, chest pressure, diarrhea, constipation, dysphagia, dizziness, sour taste in the mouth

## 2019-12-04 NOTE — ADDENDUM
[FreeTextEntry1] : Documented by Elian Garcia acting as a scribe for Dr. Kurtis Christine on 12/04/2019.\par \par All medical record entries made by the Scribe were at my, Dr. Kurtis Christine's, direction and personally dictated by me on 12/04/2019. I have reviewed the chart and agree that the record accurately reflects my personal performance of the history, physical exam, assessment and plan. I have also personally directed, reviewed, and agree with the discharge instructions.

## 2019-12-04 NOTE — ASSESSMENT
[FreeTextEntry1] : Ms. BAEZ is a 39 year old female with a history of originally from [place], former heavy smoker, hx of gut psoriasis, and seborrheic dermatitis, hypothyroidism who now comes to the office for a follow up pulmonary\par evaluation for asthma and allergy.\par \par Her shortness of breath is multifactorial due to:\par -poor mechanics of breathing\par -out of shape / overweight\par -pulmonary disease\par    >asthma\par \par problem 1: Pulmonary disease (less likely PE) c/w asthma\par - s/p Bloodwork D-dimer/ ESR (-) \par - s/p Bloodwork  total allergy profile c/w allergies \par -s/p Methacholine challenge (positive)\par -Add Singulair 10 mg before bed \par -Add Xopenex rescue inhaler 2 inhalations before exercise, Q6H \par \par problem 2 : cardiac disease doubtful? \par -Recommended to follow up with a cardiologist\par \par problem 3: Constipation\par Recommend to use Dr. Gilliam's formula\par  \par Problem 4:  Poor sleep pattern ?OSAS\par Recommend to use a home sleep study - pending result\par - Currently reports to Dr. Sky \par \par Sleep apnea is associated with adverse clinical consequences which an affect most organ systems. Cardiovascular disease risk includes arrhythmias, atrial fibrillation, hypertension, coronary artery disease, and stroke. Metabolic disorders include diabetes type 2, non-alcoholic fatty liver disease. Mood disorder especially depression; and cognitive decline especially in the elderly. Associations with chronic reflux/Maurer’s esophagus some but not all inclusive. \par -Reasons include arousal consistent with hypopnea; respiratory events most prominent in REM sleep or supine position; therefore sleep staging and body position are important for accurate diagnosis and estimation of AHI. \par \par Problem 5: Former Smoker \par Does not qualify for lung cancer screening\par \par problem  6: health maintenance\par -recommended yearly flu shot after October 15 (refused 2019)\par -recommended strep pneumonia vaccines: Prevnar-13 vaccine, followed by Pneumo vaccine 23 one year\par following\par -recommended early intervention for Upper Respiratory Infections (URIs)\par -recommended regular osteoporosis evaluations\par -recommended early dermatological evaluations\par -recommended after the age of 50 to the age of 70, colonoscopy every 5 years\par \par F/U in 3 months with SPI and NiOx\par She is encouraged to call with any changes, concerns, or questions\par

## 2019-12-09 ENCOUNTER — TRANSCRIPTION ENCOUNTER (OUTPATIENT)
Age: 39
End: 2019-12-09

## 2019-12-09 DIAGNOSIS — G47.33 OBSTRUCTIVE SLEEP APNEA (ADULT) (PEDIATRIC): ICD-10-CM

## 2019-12-18 ENCOUNTER — APPOINTMENT (OUTPATIENT)
Dept: ENDOCRINOLOGY | Facility: CLINIC | Age: 39
End: 2019-12-18

## 2020-03-06 ENCOUNTER — APPOINTMENT (OUTPATIENT)
Dept: PULMONOLOGY | Facility: CLINIC | Age: 40
End: 2020-03-06

## 2020-03-25 RX ORDER — TIOTROPIUM BROMIDE INHALATION SPRAY 3.12 UG/1
2.5 SPRAY, METERED RESPIRATORY (INHALATION) DAILY
Qty: 3 | Refills: 1 | Status: ACTIVE | COMMUNITY
Start: 2020-03-25 | End: 1900-01-01

## 2020-03-25 RX ORDER — BUDESONIDE AND FORMOTEROL FUMARATE DIHYDRATE 160; 4.5 UG/1; UG/1
160-4.5 AEROSOL RESPIRATORY (INHALATION) TWICE DAILY
Qty: 3 | Refills: 1 | Status: ACTIVE | COMMUNITY
Start: 2020-03-25 | End: 1900-01-01

## 2020-03-30 RX ORDER — ALBUTEROL SULFATE 2.5 MG/3ML
(2.5 MG/3ML) SOLUTION RESPIRATORY (INHALATION) 4 TIMES DAILY
Qty: 120 | Refills: 3 | Status: ACTIVE | COMMUNITY
Start: 2020-03-30 | End: 1900-01-01

## 2020-03-30 RX ORDER — IPRATROPIUM BROMIDE 0.5 MG/2.5ML
0.02 SOLUTION RESPIRATORY (INHALATION) 4 TIMES DAILY
Qty: 120 | Refills: 3 | Status: ACTIVE | COMMUNITY
Start: 2020-03-30 | End: 1900-01-01

## 2020-03-31 ENCOUNTER — APPOINTMENT (OUTPATIENT)
Dept: PULMONOLOGY | Facility: CLINIC | Age: 40
End: 2020-03-31
Payer: COMMERCIAL

## 2020-03-31 DIAGNOSIS — K21.9 GASTRO-ESOPHAGEAL REFLUX DISEASE W/OUT ESOPHAGITIS: ICD-10-CM

## 2020-03-31 DIAGNOSIS — R06.83 SNORING: ICD-10-CM

## 2020-03-31 DIAGNOSIS — Z87.891 PERSONAL HISTORY OF NICOTINE DEPENDENCE: ICD-10-CM

## 2020-03-31 DIAGNOSIS — R06.02 SHORTNESS OF BREATH: ICD-10-CM

## 2020-03-31 DIAGNOSIS — J30.9 ALLERGIC RHINITIS, UNSPECIFIED: ICD-10-CM

## 2020-03-31 DIAGNOSIS — J45.909 UNSPECIFIED ASTHMA, UNCOMPLICATED: ICD-10-CM

## 2020-03-31 PROCEDURE — G2012 BRIEF CHECK IN BY MD/QHP: CPT

## 2020-03-31 RX ORDER — OLOPATADINE HYDROCHLORIDE 665 UG/1
0.6 SPRAY, METERED NASAL
Qty: 3 | Refills: 1 | Status: ACTIVE | COMMUNITY
Start: 2020-03-31 | End: 1900-01-01

## 2020-03-31 RX ORDER — FAMOTIDINE 40 MG/1
40 TABLET, FILM COATED ORAL
Qty: 90 | Refills: 1 | Status: ACTIVE | COMMUNITY
Start: 2020-03-31 | End: 1900-01-01

## 2020-04-02 NOTE — HISTORY OF PRESENT ILLNESS
[Home] : at home, [unfilled] , at the time of the visit. [Medical Office: ([facility])___] : at ~his/her~ medical office located in [place] [Patient] : the patient [Self] : self [FreeTextEntry2] : PEBBLES BAEZ [FreeTextEntry1] : Ms. BAEZ is a 39 year old female with a history of  originally from [place], former heavy smoker, hx of gut  psoriasis, and seborrheic dermatitis, hypothyroidism who now is spoken to over the phone for a follow up pulmonary evaluation. Her number one \par - She notes she was told she has asthma and did not believe it at first. She was noncompliant with her asthma medications. She had symptoms over the winter and then her symptoms slowly went away but then they came back about a few weeks ago. \par - She spoke to the NP Katherine\par - She notes she does not understand her asthma patterns and shes concerned due to COVID-19\par - She notes Katherine prescribed her 2 inhalers and she notes they did not help too much\par - she notes she has had a sore throat for the past few days \par - she took her emergency inhaler and it helped a bit though it made her feel a little weird\par - she notes sometimes her inhalers help and sometimes they don’t completely. \par - she notes her sinuses are a bit congested. \par - she notes she has some PND. \par - she denies any visual issues. \par - She notes she has had some reflux. \par - she notes she has been sleeping well \par - she notes she feels limited with exercise \par -she denies any headaches, nausea, vomiting, fever, chills, sweats, chest pain, chest pressure, diarrhea, constipation, dysphagia, dizziness, sour taste in the mouth, leg swelling, leg pain, itchy eyes, itchy ears, myalgias or arthralgias.\par

## 2020-04-02 NOTE — REASON FOR VISIT
[Follow-Up] : a follow-up visit [FreeTextEntry1] : a telephonic consult for originally from [place], former heavy smoker, hx of gut  psoriasis, and seborrheic dermatitis, asthma, allergy

## 2020-04-02 NOTE — ADDENDUM
[FreeTextEntry1] : Documented by Nicki Gu acting as a scribe for Dr. Kurtis Christine on 03/31/2020.\par \par All medical record entries made by the Scribe were at my, Dr. Kurtis Christine's, direction and personally dictated by me on 03/31/2020. I have reviewed the chart and agree that the record accurately reflects my personal performance of the history, physical exam, assessment and plan. I have also personally directed, reviewed, and agree with the discharge instructions.

## 2020-04-02 NOTE — END OF VISIT
[FreeTextEntry3] : I have spent 15 minutes of time on the phone with the patient with their consent.\par

## 2020-04-02 NOTE — ASSESSMENT
[FreeTextEntry1] : Ms. BAEZ is a 39 year old female with a history of originally from [place], former heavy smoker, hx of gut psoriasis, and seborrheic dermatitis, hypothyroidism recent asthma / allergies who now had a phone evaluation for symptomatic breathing / GERD\par \par Her shortness of breath is multifactorial due to:\par -poor mechanics of breathing\par -out of shape / overweight\par -pulmonary disease\par    >asthma\par \par \par problem 1: Pulmonary disease (less likely PE) c/w asthma\par - Add Symbicort (160/80) 2 inhalations BID\par - Add Singulair 10 mg before bed\par -Add Ventolin rescue inhaler 2 inhalations before exercise, Q6H\par - s/p Bloodwork D-dimer/ ESR (-) \par - s/p Bloodwork  total allergy profile c/w allergies \par -s/p Methacholine challenge (positive)\par - continue Singulair 10 mg before bed \par - continue Xopenex rescue inhaler 2 inhalations before exercise, Q6H \par -Asthma is believed to be caused by inherited (genetic) and environmental factor, but its exact cause is unknown. Asthma may be triggered by allergens, lung infections, or irritants in the air. Asthma triggers are different for each person\par \par Problem 2: Allergy\par -Add Olopatadine 0.6% at 1 sniff/nostril BID\par Environmental measures for allergies were encouraged including mattress and pillow cover, air purifier, and environmental controls.\par \par \par Problem 3: GERD\par -Add Pepcid 40 mg QHS\par -Rule of 2s: avoid eating too much, eating too fast, eating too late, eating too spicy, eating too lousy, eating two hours before bed.\par -Things to avoid including overeating, spicy foods, tight clothing, eating within three hours of bed, this list is not all inclusive. \par -For treatment of reflux, possible options discussed including diet control, H2 blockers, PPIs, as well as coating motility agents discussed as treatment options. Timing of meals and proximity of last meal to sleep were discussed. If symptoms persist, a formal gastrointestinal evaluation is needed.\par \par problem 4 : cardiac disease doubtful? \par -Recommended to follow up with a cardiologist\par \par problem 5: Constipation\par Recommend to use Dr. Gilliam's formula\par  \par Problem 6:  Poor sleep pattern ?OSAS\par Recommend to use a home sleep study - pending result\par - Currently reports to Dr. Sky \par \par Sleep apnea is associated with adverse clinical consequences which an affect most organ systems. Cardiovascular disease risk includes arrhythmias, atrial fibrillation, hypertension, coronary artery disease, and stroke. Metabolic disorders include diabetes type 2, non-alcoholic fatty liver disease. Mood disorder especially depression; and cognitive decline especially in the elderly. Associations with chronic reflux/Maurer’s esophagus some but not all inclusive. \par -Reasons include arousal consistent with hypopnea; respiratory events most prominent in REM sleep or supine position; therefore sleep staging and body position are important for accurate diagnosis and estimation of AHI. \par \par Problem 7: Former Smoker \par Does not qualify for lung cancer screening\par \par problem  8: health maintenance\par -recommended yearly flu shot after October 15 (refused 2019)\par -recommended strep pneumonia vaccines: Prevnar-13 vaccine, followed by Pneumo vaccine 23 one year\par following\par -recommended early intervention for Upper Respiratory Infections (URIs)\par -recommended regular osteoporosis evaluations\par -recommended early dermatological evaluations\par -recommended after the age of 50 to the age of 70, colonoscopy every 5 years\par \par F/U in 3 months with SPI and NiOx\par She is encouraged to call with any changes, concerns, or questions\par

## 2020-04-28 NOTE — PRE-OP CHECKLIST - WAS PATIENT ON BETA BLOCKER?
Detail Level: Detailed No Apply triamcinolone BID x2 weeks. Pt has triamcinolone ointment no need to send in new prescription.

## 2020-06-03 ENCOUNTER — TRANSCRIPTION ENCOUNTER (OUTPATIENT)
Age: 40
End: 2020-06-03

## 2020-10-22 NOTE — ED ADULT NURSE NOTE - GENITOURINARY WDL
ZACKARY  Patient presented to the ED at [facility] after being found unresponsive. Transferred to [facility] for a higher level of care. RUR 29%  Disposition: TBD    PCP: First and Last name: Dr Tucker Wu   Name of Practice:   Are you a current patient: Yes/No:yes   Approximate date of last visit: Unknown   Can you do a virtual visit with your PCP: Yes             Resources/supports as identified by patient/family:   Patient lives with his mother and she supports him financially                Top Challenges facing patient (as identified by patient/family and CM): Patient has substance abuse problems                     Finances/Medication cost?   Confirmed insurance to be [employer]? [employer]              Support system or lack thereof? Mother and sister                     Living arrangements? Lives with mother           Self-care/ADLs/Cognition? A&O          Current Advanced Directive/Advance Care Plan  Not on file:                            Plan for utilizing home health:    TBD                 Care manager met with patient's mother Josie Cushing [phone] to introduce self and explain role. Per mother patient lives with her, no previous HH or equipment needs. Patient does not work and mother supports him financially. She confirmed his PCP to be Dr Janneth Acevedo and he sees him yearly and uses the [facility] in [place] as his pharmacy. Mother has requested a psych eval while patient is here. Per mother patient has never attended any substance abuse programs. Will follow up with patient once he is more awake.    Montez Johnson RN,Care Management      Care Management Interventions  PCP Verified by CM: (Dr Janneth Acevedo)  [address] [place]: No  Discharge Durable Medical Equipment: No  Physical Therapy Consult: No  Occupational Therapy Consult: No  Speech Therapy Consult: No  Current Support Network: Relative's Home(mother Josie Cushing [phone])  Confirm Follow Up Transport: Family Bladder non-tender and non-distended. Urine clear yellow.

## 2020-11-22 ENCOUNTER — TRANSCRIPTION ENCOUNTER (OUTPATIENT)
Age: 40
End: 2020-11-22

## 2021-11-12 ENCOUNTER — APPOINTMENT (OUTPATIENT)
Dept: PULMONOLOGY | Facility: CLINIC | Age: 41
End: 2021-11-12
Payer: COMMERCIAL

## 2021-11-12 VITALS — BODY MASS INDEX: 24.91 KG/M2 | HEIGHT: 66 IN | WEIGHT: 155 LBS

## 2021-11-12 DIAGNOSIS — R05.9 COUGH, UNSPECIFIED: ICD-10-CM

## 2021-11-12 DIAGNOSIS — U07.1 COVID-19: ICD-10-CM

## 2021-11-12 PROCEDURE — 99213 OFFICE O/P EST LOW 20 MIN: CPT | Mod: 95

## 2021-11-12 RX ORDER — ALBUTEROL SULFATE 90 UG/1
108 (90 BASE) INHALANT RESPIRATORY (INHALATION)
Qty: 1 | Refills: 1 | Status: ACTIVE | COMMUNITY
Start: 2021-11-12 | End: 1900-01-01

## 2021-11-12 RX ORDER — BUDESONIDE AND FORMOTEROL FUMARATE DIHYDRATE 160; 4.5 UG/1; UG/1
160-4.5 AEROSOL RESPIRATORY (INHALATION) TWICE DAILY
Qty: 1 | Refills: 0 | Status: ACTIVE | COMMUNITY
Start: 2021-11-12 | End: 1900-01-01

## 2021-11-12 RX ORDER — BENZONATATE 200 MG/1
200 CAPSULE ORAL 3 TIMES DAILY
Qty: 30 | Refills: 0 | Status: ACTIVE | COMMUNITY
Start: 2021-11-12 | End: 1900-01-01

## 2021-11-12 NOTE — HISTORY OF PRESENT ILLNESS
[Home] : at home, [unfilled] , at the time of the visit. [Medical Office: ([facility])___] : at the medical office located in  [Verbal consent obtained from patient] : the patient, [unfilled] [FreeTextEntry1] : Ms. BAEZ is a 41 year old female with a history of originally from [place], former heavy smoker, hx of gut psoriasis, and seborrheic dermatitis, hypothyroidism who now presents via video for acute care CROWN visit. \par \par Her chief concern is testing (+) for Covid-19 infection on 21.\par \par She states symptom onset 21 of headache.  She states symptoms continued to worsen to fever and cough.  She states she received MAB infusion , 21. She states she is starting to feel very slight improvement.  She notes she cough, fatigue, and low grade fever (100.5) persist. \par \par She has HR and SPO2 monitored via apple watch.  She states SPO2 97-99%.  She states concerns of Max HR of 100 BPM, which is elevated for her.  She states she also experience sweaty palms.  She is unsure if this is r/t anxiety.  She tried her father's Xanax and did feel improvement in these symptoms.  \par \par She is not currently on inhaler therapy. She has Albuterol and Ipratropium nebulizer solutions at home along with  Symbicort and Spiriva. \par She notes her PCP Rx'ed Azithromycin, which she completed.\par \par She denies SOB or wheezing. \par \par

## 2021-11-12 NOTE — PLAN
[FreeTextEntry1] : Ms. BAEZ is a 41 year old female with a history of originally from [place], former heavy smoker, hx of gut psoriasis, and seborrheic dermatitis, hypothyroidism who now presents via video for acute care CROWN visit. \par Her chief concern is testing (+) for Covid-19 infection at [facility] on 11/8/21.\par \par 1. Covid-19:\par - Patient to have home lab draws for CBC, CMP and Inflammatory Markers Serum labs done.\par - Add Vitamin D 2000 mg QD, Vitamin C 1000 mg BID, Quercitin 500 mg BID, Melatonin 5 mg QHS & Zinc 100 mg QD OTC.\par - Add Benzonatate 200 mg PO TID. \par - Recommendations to patients with possible or confirmed COVID-19:\par     1.Stay home and away from public places. If you must go out, avoid using any kind of public transportation,       ridesharing, or taxis.\par     2.Monitor your symptoms carefully. If your symptoms get worse, call your healthcare provider immediately.\par     3. Get rest and stay hydrated.\par     4.Monitor temperature- treat with Tylenol 650 mg Q 6 hours up to 1000 mg TID-QID but not exceed 3500 mg daily     for liver precautions. Avoid use of NSAIDs.\par     5. Be sure to continue to take your maintenance medications for chronic conditions.\par     As much as possible, stay in a specific room and away from other people in your home. Also, you should use a     separate bathroom, if available. If you need to be around other people in or outside of the home, wear a     facemask.\par - If you develop emergency warning signs for serious complications for COVID-19 get medical attention immediately. Emergency warning signs include*:\par     • Trouble breathing/worsening- unresolved SOB\par     • Persistent pain or pressure in the chest\par     • New confusion or inability to arouse\par     • Bluish lips or face\par     • Worsening fatigue/malaise\par     • Inability to eat or drink\par     • High fever unresponsive to Tylenol\par \par 2. Asthma:\par - Add Albuterol via nebulizer or HFA BID x 7 days (discussed if neb is used to ensure she does it in her quarantine room as risk of infecting others).\par - Add Symbicort 2 puffs BID - rinse and gargle post use. \par \par Patient to follow up with Dr. Christine in 1-3 months for PFTs and follow up. \par Patient to call with further questions and concerns.\par Patient verbalizes understanding of care plan and is agreeable.\par \par

## 2021-11-15 ENCOUNTER — NON-APPOINTMENT (OUTPATIENT)
Age: 41
End: 2021-11-15

## 2021-11-15 LAB
ALBUMIN SERPL ELPH-MCNC: 4.4 G/DL
ALP BLD-CCNC: 56 U/L
ALT SERPL-CCNC: 22 U/L
ANION GAP SERPL CALC-SCNC: 13 MMOL/L
AST SERPL-CCNC: 25 U/L
BASOPHILS # BLD AUTO: 0.02 K/UL
BASOPHILS NFR BLD AUTO: 0.5 %
BILIRUB SERPL-MCNC: 0.2 MG/DL
BUN SERPL-MCNC: 11 MG/DL
CALCIUM SERPL-MCNC: 8.9 MG/DL
CHLORIDE SERPL-SCNC: 107 MMOL/L
CO2 SERPL-SCNC: 21 MMOL/L
CREAT SERPL-MCNC: 0.75 MG/DL
CRP SERPL-MCNC: 7 MG/L
DEPRECATED D DIMER PPP IA-ACNC: <150 NG/ML DDU
EOSINOPHIL # BLD AUTO: 0.05 K/UL
EOSINOPHIL NFR BLD AUTO: 1.3 %
ERYTHROCYTE [SEDIMENTATION RATE] IN BLOOD BY WESTERGREN METHOD: 26 MM/HR
FERRITIN SERPL-MCNC: 147 NG/ML
GLUCOSE SERPL-MCNC: 95 MG/DL
HCT VFR BLD CALC: 37.6 %
HGB BLD-MCNC: 13.1 G/DL
IMM GRANULOCYTES NFR BLD AUTO: 0.3 %
LYMPHOCYTES # BLD AUTO: 1.81 K/UL
LYMPHOCYTES NFR BLD AUTO: 45.9 %
MAN DIFF?: NORMAL
MCHC RBC-ENTMCNC: 30.6 PG
MCHC RBC-ENTMCNC: 34.8 GM/DL
MCV RBC AUTO: 87.9 FL
MONOCYTES # BLD AUTO: 0.48 K/UL
MONOCYTES NFR BLD AUTO: 12.2 %
NEUTROPHILS # BLD AUTO: 1.57 K/UL
NEUTROPHILS NFR BLD AUTO: 39.8 %
PLATELET # BLD AUTO: 226 K/UL
POTASSIUM SERPL-SCNC: 4.1 MMOL/L
PROCALCITONIN SERPL-MCNC: 0.04 NG/ML
PROT SERPL-MCNC: 7 G/DL
RBC # BLD: 4.28 M/UL
RBC # FLD: 12.7 %
SODIUM SERPL-SCNC: 141 MMOL/L
WBC # FLD AUTO: 3.94 K/UL

## 2022-02-10 ENCOUNTER — APPOINTMENT (OUTPATIENT)
Dept: MAMMOGRAPHY | Facility: CLINIC | Age: 42
End: 2022-02-10

## 2022-02-10 ENCOUNTER — APPOINTMENT (OUTPATIENT)
Dept: ULTRASOUND IMAGING | Facility: CLINIC | Age: 42
End: 2022-02-10

## 2022-04-19 ENCOUNTER — TRANSCRIPTION ENCOUNTER (OUTPATIENT)
Age: 42
End: 2022-04-19

## 2022-04-26 NOTE — DISCHARGE NOTE ADULT - CARE PLAN
Principal Discharge DX:	Umbilical hernia without obstruction and without gangrene  Goal:	s/p umbilical hernia repair with mesh. Pain control and outpatient.  Instructions for follow-up, activity and diet:	Please do not drive until your pain is well controlled, and you do not need to take pain medications. These medications may make you constipated. If so, please take over the counter stool softeners for symptoms.  Please follow up with Dr. Roman Monday at [facility]. Contact info below. Detail Level: Zone Instructions: This plan will send the code FBSD to the PM system.  DO NOT or CHANGE the price. Price (Do Not Change): 0.00 Principal Discharge DX:	Umbilical hernia without obstruction and without gangrene  Goal:	s/p umbilical hernia repair with mesh. Pain control and outpatient.  Instructions for follow-up, activity and diet:	Please do not drive until your pain is well controlled, and you do not need to take pain medications. These medications may make you constipated. If so, please take over the counter stool softeners for symptoms.  Please follow up with Dr. Roman Monday at [facility]. Contact info below. Please follow up with Ophthalmology, Dr. Pacheco, in 1-2 weeks for a repeat eye exam. The office address is [address] [place] [address] eye clinic; Please call [phone] to schedule an appointment. Principal Discharge DX:	Umbilical hernia without obstruction and without gangrene  Goal:	s/p umbilical hernia repair with mesh. Pain control and outpatient.  Instructions for follow-up, activity and diet:	Please do not drive until your pain is well controlled, and you do not need to take pain medications. These medications may make you constipated. If so, please take over the counter stool softeners for symptoms.  Please follow up with Dr. Roman Monday at [facility]. Contact info below. Please follow up with Ophthalmology, Dr. Pacheco, in 1-2 weeks for a repeat eye exam. The office address is [address] [place] [address] eye clinic; Please call [phone] to schedule an appointment. Principal Discharge DX:	Umbilical hernia without obstruction and without gangrene  Goal:	s/p umbilical hernia repair with mesh. Pain control and outpatient.  Instructions for follow-up, activity and diet:	Please do not drive until your pain is well controlled, and you do not need to take pain medications. These medications may make you constipated. If so, please take over the counter stool softeners for symptoms.  Please follow up with Dr. Roman Monday at [facility]. Contact info below. Please follow up with Ophthalmology, Dr. Pacheco, in 1-2 weeks for a repeat eye exam. The office address is [address] [place] [address] eye clinic; Please call [phone] to schedule an appointment.

## 2022-12-10 ENCOUNTER — NON-APPOINTMENT (OUTPATIENT)
Age: 42
End: 2022-12-10

## 2023-11-25 ENCOUNTER — EMERGENCY (EMERGENCY)
Facility: HOSPITAL | Age: 43
LOS: 1 days | Discharge: HOME | End: 2023-11-25
Attending: EMERGENCY MEDICINE
Payer: COMMERCIAL

## 2023-11-25 VITALS
HEART RATE: 81 BPM | HEIGHT: 66 IN | SYSTOLIC BLOOD PRESSURE: 124 MMHG | TEMPERATURE: 98 F | RESPIRATION RATE: 19 BRPM | WEIGHT: 149.91 LBS | OXYGEN SATURATION: 99 % | DIASTOLIC BLOOD PRESSURE: 86 MMHG

## 2023-11-25 VITALS
OXYGEN SATURATION: 97 % | HEART RATE: 78 BPM | DIASTOLIC BLOOD PRESSURE: 82 MMHG | RESPIRATION RATE: 18 BRPM | SYSTOLIC BLOOD PRESSURE: 118 MMHG

## 2023-11-25 DIAGNOSIS — Z90.79 ACQUIRED ABSENCE OF OTHER GENITAL ORGAN(S): Chronic | ICD-10-CM

## 2023-11-25 DIAGNOSIS — Z98.890 OTHER SPECIFIED POSTPROCEDURAL STATES: Chronic | ICD-10-CM

## 2023-11-25 LAB
ALBUMIN SERPL ELPH-MCNC: 4.4 G/DL — SIGNIFICANT CHANGE UP (ref 3.3–5)
ALBUMIN SERPL ELPH-MCNC: 4.4 G/DL — SIGNIFICANT CHANGE UP (ref 3.3–5)
ALP SERPL-CCNC: 53 U/L — SIGNIFICANT CHANGE UP (ref 40–120)
ALP SERPL-CCNC: 53 U/L — SIGNIFICANT CHANGE UP (ref 40–120)
ALT FLD-CCNC: 30 U/L — SIGNIFICANT CHANGE UP (ref 10–45)
ALT FLD-CCNC: 30 U/L — SIGNIFICANT CHANGE UP (ref 10–45)
ANION GAP SERPL CALC-SCNC: 10 MMOL/L — SIGNIFICANT CHANGE UP (ref 5–17)
ANION GAP SERPL CALC-SCNC: 10 MMOL/L — SIGNIFICANT CHANGE UP (ref 5–17)
AST SERPL-CCNC: 24 U/L — SIGNIFICANT CHANGE UP (ref 10–40)
AST SERPL-CCNC: 24 U/L — SIGNIFICANT CHANGE UP (ref 10–40)
BASOPHILS # BLD AUTO: 0.06 K/UL — SIGNIFICANT CHANGE UP (ref 0–0.2)
BASOPHILS # BLD AUTO: 0.06 K/UL — SIGNIFICANT CHANGE UP (ref 0–0.2)
BASOPHILS NFR BLD AUTO: 1 % — SIGNIFICANT CHANGE UP (ref 0–2)
BASOPHILS NFR BLD AUTO: 1 % — SIGNIFICANT CHANGE UP (ref 0–2)
BILIRUB SERPL-MCNC: 0.2 MG/DL — SIGNIFICANT CHANGE UP (ref 0.2–1.2)
BILIRUB SERPL-MCNC: 0.2 MG/DL — SIGNIFICANT CHANGE UP (ref 0.2–1.2)
BUN SERPL-MCNC: 12 MG/DL — SIGNIFICANT CHANGE UP (ref 7–23)
BUN SERPL-MCNC: 12 MG/DL — SIGNIFICANT CHANGE UP (ref 7–23)
CALCIUM SERPL-MCNC: 9 MG/DL — SIGNIFICANT CHANGE UP (ref 8.4–10.5)
CALCIUM SERPL-MCNC: 9 MG/DL — SIGNIFICANT CHANGE UP (ref 8.4–10.5)
CHLORIDE SERPL-SCNC: 104 MMOL/L — SIGNIFICANT CHANGE UP (ref 96–108)
CHLORIDE SERPL-SCNC: 104 MMOL/L — SIGNIFICANT CHANGE UP (ref 96–108)
CO2 SERPL-SCNC: 25 MMOL/L — SIGNIFICANT CHANGE UP (ref 22–31)
CO2 SERPL-SCNC: 25 MMOL/L — SIGNIFICANT CHANGE UP (ref 22–31)
CREAT SERPL-MCNC: 1.08 MG/DL — SIGNIFICANT CHANGE UP (ref 0.5–1.3)
CREAT SERPL-MCNC: 1.08 MG/DL — SIGNIFICANT CHANGE UP (ref 0.5–1.3)
EGFR: 65 ML/MIN/1.73M2 — SIGNIFICANT CHANGE UP
EGFR: 65 ML/MIN/1.73M2 — SIGNIFICANT CHANGE UP
EOSINOPHIL # BLD AUTO: 0.15 K/UL — SIGNIFICANT CHANGE UP (ref 0–0.5)
EOSINOPHIL # BLD AUTO: 0.15 K/UL — SIGNIFICANT CHANGE UP (ref 0–0.5)
EOSINOPHIL NFR BLD AUTO: 2.5 % — SIGNIFICANT CHANGE UP (ref 0–6)
EOSINOPHIL NFR BLD AUTO: 2.5 % — SIGNIFICANT CHANGE UP (ref 0–6)
GLUCOSE SERPL-MCNC: 91 MG/DL — SIGNIFICANT CHANGE UP (ref 70–99)
GLUCOSE SERPL-MCNC: 91 MG/DL — SIGNIFICANT CHANGE UP (ref 70–99)
HCG SERPL-ACNC: <2 MIU/ML — SIGNIFICANT CHANGE UP
HCG SERPL-ACNC: <2 MIU/ML — SIGNIFICANT CHANGE UP
HCT VFR BLD CALC: 40 % — SIGNIFICANT CHANGE UP (ref 34.5–45)
HCT VFR BLD CALC: 40 % — SIGNIFICANT CHANGE UP (ref 34.5–45)
HGB BLD-MCNC: 12.9 G/DL — SIGNIFICANT CHANGE UP (ref 11.5–15.5)
HGB BLD-MCNC: 12.9 G/DL — SIGNIFICANT CHANGE UP (ref 11.5–15.5)
IMM GRANULOCYTES NFR BLD AUTO: 0.3 % — SIGNIFICANT CHANGE UP (ref 0–0.9)
IMM GRANULOCYTES NFR BLD AUTO: 0.3 % — SIGNIFICANT CHANGE UP (ref 0–0.9)
LYMPHOCYTES # BLD AUTO: 2.24 K/UL — SIGNIFICANT CHANGE UP (ref 1–3.3)
LYMPHOCYTES # BLD AUTO: 2.24 K/UL — SIGNIFICANT CHANGE UP (ref 1–3.3)
LYMPHOCYTES # BLD AUTO: 37.2 % — SIGNIFICANT CHANGE UP (ref 13–44)
LYMPHOCYTES # BLD AUTO: 37.2 % — SIGNIFICANT CHANGE UP (ref 13–44)
MCHC RBC-ENTMCNC: 29.2 PG — SIGNIFICANT CHANGE UP (ref 27–34)
MCHC RBC-ENTMCNC: 29.2 PG — SIGNIFICANT CHANGE UP (ref 27–34)
MCHC RBC-ENTMCNC: 32.3 GM/DL — SIGNIFICANT CHANGE UP (ref 32–36)
MCHC RBC-ENTMCNC: 32.3 GM/DL — SIGNIFICANT CHANGE UP (ref 32–36)
MCV RBC AUTO: 90.5 FL — SIGNIFICANT CHANGE UP (ref 80–100)
MCV RBC AUTO: 90.5 FL — SIGNIFICANT CHANGE UP (ref 80–100)
MONOCYTES # BLD AUTO: 0.56 K/UL — SIGNIFICANT CHANGE UP (ref 0–0.9)
MONOCYTES # BLD AUTO: 0.56 K/UL — SIGNIFICANT CHANGE UP (ref 0–0.9)
MONOCYTES NFR BLD AUTO: 9.3 % — SIGNIFICANT CHANGE UP (ref 2–14)
MONOCYTES NFR BLD AUTO: 9.3 % — SIGNIFICANT CHANGE UP (ref 2–14)
NEUTROPHILS # BLD AUTO: 2.99 K/UL — SIGNIFICANT CHANGE UP (ref 1.8–7.4)
NEUTROPHILS # BLD AUTO: 2.99 K/UL — SIGNIFICANT CHANGE UP (ref 1.8–7.4)
NEUTROPHILS NFR BLD AUTO: 49.7 % — SIGNIFICANT CHANGE UP (ref 43–77)
NEUTROPHILS NFR BLD AUTO: 49.7 % — SIGNIFICANT CHANGE UP (ref 43–77)
NRBC # BLD: 0 /100 WBCS — SIGNIFICANT CHANGE UP (ref 0–0)
NRBC # BLD: 0 /100 WBCS — SIGNIFICANT CHANGE UP (ref 0–0)
PLATELET # BLD AUTO: 264 K/UL — SIGNIFICANT CHANGE UP (ref 150–400)
PLATELET # BLD AUTO: 264 K/UL — SIGNIFICANT CHANGE UP (ref 150–400)
POTASSIUM SERPL-MCNC: 3.9 MMOL/L — SIGNIFICANT CHANGE UP (ref 3.5–5.3)
POTASSIUM SERPL-MCNC: 3.9 MMOL/L — SIGNIFICANT CHANGE UP (ref 3.5–5.3)
POTASSIUM SERPL-SCNC: 3.9 MMOL/L — SIGNIFICANT CHANGE UP (ref 3.5–5.3)
POTASSIUM SERPL-SCNC: 3.9 MMOL/L — SIGNIFICANT CHANGE UP (ref 3.5–5.3)
PROT SERPL-MCNC: 7 G/DL — SIGNIFICANT CHANGE UP (ref 6–8.3)
PROT SERPL-MCNC: 7 G/DL — SIGNIFICANT CHANGE UP (ref 6–8.3)
RBC # BLD: 4.42 M/UL — SIGNIFICANT CHANGE UP (ref 3.8–5.2)
RBC # BLD: 4.42 M/UL — SIGNIFICANT CHANGE UP (ref 3.8–5.2)
RBC # FLD: 13 % — SIGNIFICANT CHANGE UP (ref 10.3–14.5)
RBC # FLD: 13 % — SIGNIFICANT CHANGE UP (ref 10.3–14.5)
SODIUM SERPL-SCNC: 139 MMOL/L — SIGNIFICANT CHANGE UP (ref 135–145)
SODIUM SERPL-SCNC: 139 MMOL/L — SIGNIFICANT CHANGE UP (ref 135–145)
WBC # BLD: 6.02 K/UL — SIGNIFICANT CHANGE UP (ref 3.8–10.5)
WBC # BLD: 6.02 K/UL — SIGNIFICANT CHANGE UP (ref 3.8–10.5)
WBC # FLD AUTO: 6.02 K/UL — SIGNIFICANT CHANGE UP (ref 3.8–10.5)
WBC # FLD AUTO: 6.02 K/UL — SIGNIFICANT CHANGE UP (ref 3.8–10.5)

## 2023-11-25 PROCEDURE — 86901 BLOOD TYPING SEROLOGIC RH(D): CPT

## 2023-11-25 PROCEDURE — 76830 TRANSVAGINAL US NON-OB: CPT

## 2023-11-25 PROCEDURE — 84702 CHORIONIC GONADOTROPIN TEST: CPT

## 2023-11-25 PROCEDURE — 99285 EMERGENCY DEPT VISIT HI MDM: CPT

## 2023-11-25 PROCEDURE — 86900 BLOOD TYPING SEROLOGIC ABO: CPT

## 2023-11-25 PROCEDURE — 86850 RBC ANTIBODY SCREEN: CPT

## 2023-11-25 PROCEDURE — 85025 COMPLETE CBC W/AUTO DIFF WBC: CPT

## 2023-11-25 PROCEDURE — 36415 COLL VENOUS BLD VENIPUNCTURE: CPT

## 2023-11-25 PROCEDURE — 80053 COMPREHEN METABOLIC PANEL: CPT

## 2023-11-25 PROCEDURE — 76830 TRANSVAGINAL US NON-OB: CPT | Mod: 26

## 2023-11-25 PROCEDURE — 99284 EMERGENCY DEPT VISIT MOD MDM: CPT | Mod: 25

## 2023-11-25 PROCEDURE — 99283 EMERGENCY DEPT VISIT LOW MDM: CPT | Mod: GC

## 2023-11-25 NOTE — ED PROVIDER NOTE - PATIENT PORTAL LINK FT
You can access the FollowMyHealth Patient Portal offered by [facility] by registering at the following website: [email or web address]. By joining [employer]’s FollowMyHealth portal, you will also be able to view your health information using other applications (apps) compatible with our system.

## 2023-11-25 NOTE — ED ADULT NURSE NOTE - OBJECTIVE STATEMENT
42 yo female presenting to ed ambulatory, A&ox3, complaining of vaginal bleeding. pt reports heavy vaginal bleeding [id number], changing 1pad/hr with clots. abdominal cramping and intermittent dizziness associated with the bleeding. pt reports having an iud. pt denies fevers, chills, syncopal episodes n/v/d, chest pain, shortness of breath. pt breathing spontaneous and unlabored. abd soft. Safety and comfort measures provided, bed locked and in lowest position, side rails up for safety. Call bell within reach. Awaiting results

## 2023-11-25 NOTE — ED PROVIDER NOTE - NSFOLLOWUPINSTRUCTIONS_ED_ALL_ED_FT
Please make sure to follow up with your primary care doctor within 1-2 days and with the GYN specialist. Bring a copy of all of your results with you to your follow up appointments.   Return to the ER as discussed if you develop any new or worsening symptoms.     GYN recommended an Endometrial biopsy with your primary GYN, Dr. Gipson. Make sure to go to your scheduled with GYN on 12/5/23.   - Strict ED return precautions if you are saturating >2 pads in 1hr for >2 consecutive hours or if you are experiencing lightheadedness or feeling faint as if you want to pass out.       Avoid NSAIDs including Motrin, Advil, Alleve and Aspirin.

## 2023-11-25 NOTE — ED PROVIDER NOTE - DIFFERENTIAL DIAGNOSIS
Metrorrhagia with differential not limited to fibroid versus malignancy versus hormonal. Differential Diagnosis

## 2023-11-25 NOTE — ED PROVIDER NOTE - CLINICAL SUMMARY MEDICAL DECISION MAKING FREE TEXT BOX
Attending note.  See differential above.  Patient with metrorrhagia for 1 month with intermittent lightheadedness and no significant cramping.  Ultrasound of pelvis, labs to rule out anemia.  Patient is traveling to [place] tomorrow for conference.  GYN consultation regarding possible hormonal regulation until follow-up with her own GYN.

## 2023-11-25 NOTE — ED PROVIDER NOTE - NSICDXPASTSURGICALHX_GEN_ALL_CORE_FT
PAST SURGICAL HISTORY:  H/O abdominoplasty     H/O unilateral salpingectomy left side after ruptured ectopic pregnancy    S/P bunionectomy

## 2023-11-25 NOTE — CONSULT NOTE ADULT - ATTENDING COMMENTS
Pt seen with PGY2 Mychal.   44yo presenting with AUB. Hgb 12.9. No heavy bleeding on bad. Pt normotensive, HR 80s. No s/sx anemia. TVUS with endometrial lining 3mm, Paragard IUD in place, no acute findings. No indication for acute GYN intervention. Reviewed with patient importance of close outpatient follow up and work up including EMB and possible sonohysterogram. Pt scheduled for follow up with private Gyn (Dr. Gipson) on 12/5. Return precautions provided.     Paolo PRINCE

## 2023-11-25 NOTE — CONSULT NOTE ADULT - SUBJECTIVE AND OBJECTIVE BOX
PEBBLES BAEZ  43y  Female [id number]    HPI:        Name of GYN Physician:   OBHx:    GynHx: Denies fibroids, cysts, endometriosis, STI's, Abnormal pap smears   PMH:  PSH:  Meds:  Allx:  Social History:  Denies smoking use, drug use, alcohol use.   +occasional social alcohol use    Vital Signs Last 24 Hrs  T(C): 36.7 (25 Nov 2023 12:55), Max: 36.7 (25 Nov 2023 12:55)  T(F): 98.1 (25 Nov 2023 12:55), Max: 98.1 (25 Nov 2023 12:55)  HR: 82 (25 Nov 2023 12:55) (81 - 82)  BP: 108/72 (25 Nov 2023 12:55) (108/72 - 124/86)  RR: 16 (25 Nov 2023 12:55) (16 - 19)  SpO2: 98% (25 Nov 2023 12:55) (98% - 99%)    Parameters below as of 25 Nov 2023 12:55  Patient On (Oxygen Delivery Method): room air    Physical Exam:   General: sitting comfortably in bed, NAD   HEENT: neck supple, full ROM  CV: extremities well perfused  Lungs: normal respiratory effort on room air  Back: No CVA tenderness  Abd: Soft, non-tender, non-distended  :  No bleeding on pad.  External labia wnl. Bimanual exam with no cervical motion tenderness, uterus wnl, adnexa non palpable b/l.  Cervix closed vs. Cervix dilated  Speculum Exam: No active bleeding from os.  Ext: non-tender b/l, no edema     LABS:                        12.9   6.02  )-----------( 264      ( 25 Nov 2023 12:55 )             40.0     11-25    139  |  104  |  12  ----------------------------<  91  3.9   |  25  |  1.08    Ca    9.0      25 Nov 2023 12:55    TPro  7.0  /  Alb  4.4  /  TBili  0.2  /  DBili  x   /  AST  24  /  ALT  30  /  AlkPhos  53  11-25    I&O's Detail    Urinalysis Basic - ( 25 Nov 2023 12:55 )    Color: x / Appearance: x / SG: x / pH: x  Gluc: 91 mg/dL / Ketone: x  / Bili: x / Urobili: x   Blood: x / Protein: x / Nitrite: x   Leuk Esterase: x / RBC: x / WBC x   Sq Epi: x / Non Sq Epi: x / Bacteria: x    RADIOLOGY & ADDITIONAL STUDIES:  < from: US Transvaginal (11.25.23 @ 14:01) >  ACC: [id number] EXAM:  US TRANSVAGINAL   ORDERED BY:  KELLY HENDERSON   PROCEDURE DATE:  11/25/2023        INTERPRETATION:  CLINICAL INFORMATION: Vaginal bleeding    LMP: 10/30/2023    COMPARISON: None available.    TECHNIQUE:  Endovaginal pelvic sonogram as per order. Transabdominal pelvic sonogram   was also performed as part of our standard protocol. Color and Spectral   Doppler was performed.    FINDINGS:  Uterus: Anteverted. 8.4 cm x  4.9 cm x 4.4 cm. Intrauterine device in   appropriate position  Endometrium: 3 mm. Within normal limits.  Cervix: Scattered nabothian cysts.    Right ovary: 1.5 cm x 1.0 cm x 1.8 cm. Within normal limits. Normal   arterial and venous waveforms.  Left ovary: 3.5 cm x 2.7 cm x 2.3 cm. Status post left salpingectomy   Inclusive of a simple adnexal cyst measuring 2.4 cm Normal arterial and   venous waveforms.    Fluid: No pelvic cul-de-sac fluid.    IMPRESSION:  Unremarkable pelvic sonogram.    --- End of Report ---     MIKAEL DICKERSON MD; Resident Radiologist  This document has been electronically signed.  BRANDIN FOY MD; Attending Radiologist  This document has been electronically signed. Nov 25 2023  2:25PM    < end of copied text >       PEBBLES BAEZ  43y  Female [id number]    HPI: Patient is a 44yo  LMP 10/30 presenting for prolonged menstruation lasting 4 weeks. Pt reports the first 2 weeks were lighter than normal menses, then second 2 weeks became progressively heavier. Has been changing a tampon once per hour for the last several days. Reports the heavy flow is intermittent, heavier at night. Associated intermittent lightheadedness. Denies syncope, CP, SOB, difficulty breathing. Prior to the last month, patient's menses have been regular monthly intervals and lasted 6 days. She has a Paragard IUD in place for 2 years which was a replacement after having prior Paragard for full 10 years. Has never had prolonged/heavy menstruation with Paragard. Pt states she is flying to [place] for conference tomorrow and does not want to end up in ED there due to vaginal bleeding. Pt has an appt with her primary GYN on  who she has been seeing for >10years.    Name of GYN Physician: Dr. Luis Gipson  OBHx:    x2 (, )  Ruptured ectopic pregnancy s/p open salpingectomy with 2.5L hemoperitoneum  Suspected ectopic pregnancy s/p MTX  MAB w/ D&C  GynHx: H/o abnormal pap smear s/p normal colposcopy, Denies fibroids, cysts, endometriosis, STI's  PMH: chronic constipation  PSH: Open salpingectomy for ruptured ectopic pregnancy  Meds: Wellbutrin, Motegrity  Allx: NKDA  Social History: Former cigarette use (quit 10yrs ago with help of Wellbutrin), Denies current smoking use, drug use, alcohol use    Vital Signs Last 24 Hrs  T(C): 36.7 (2023 12:55), Max: 36.7 (2023 12:55)  T(F): 98.1 (2023 12:55), Max: 98.1 (2023 12:55)  HR: 82 (2023 12:55) (81 - 82)  BP: 108/72 (2023 12:55) (108/72 - 124/86)  RR: 16 (2023 12:55) (16 - 19)  SpO2: 98% (2023 12:55) (98% - 99%)    Parameters below as of 2023 12:55  Patient On (Oxygen Delivery Method): room air    Physical Exam: patient declined chaperone  General: sitting comfortably in bed, NAD   HEENT: neck supple, full ROM  CV: extremities well perfused  Lungs: normal respiratory effort on room air  Abd: Soft, non-tender, non-distended  : No bleeding on pad. External labia wnl. Bimanual exam with no cervical motion tenderness, uterus mobile and wnl, adnexa non palpable b/l. Cervix closed.  Speculum Exam: No active bleeding from os. Approx 5cc dark red blood in vault. IUD strings not visualized.  Ext: non-tender b/l, no edema     LABS:                        12.9   6.02  )-----------( 264      ( 2023 12:55 )             40.0         139  |  104  |  12  ----------------------------<  91  3.9   |  25  |  1.08    Ca    9.0      2023 12:55    TPro  7.0  /  Alb  4.4  /  TBili  0.2  /  DBili  x   /  AST  24  /  ALT  30  /  AlkPhos  53      I&O's Detail    Urinalysis Basic - ( 2023 12:55 )    Color: x / Appearance: x / SG: x / pH: x  Gluc: 91 mg/dL / Ketone: x  / Bili: x / Urobili: x   Blood: x / Protein: x / Nitrite: x   Leuk Esterase: x / RBC: x / WBC x   Sq Epi: x / Non Sq Epi: x / Bacteria: x    RADIOLOGY & ADDITIONAL STUDIES:  < from: US Transvaginal (23 @ 14:01) >  ACC: [id number] EXAM:  US TRANSVAGINAL   ORDERED BY:  KELLY HENDERSON   PROCEDURE DATE:  2023        INTERPRETATION:  CLINICAL INFORMATION: Vaginal bleeding    LMP: 10/30/2023    COMPARISON: None available.    TECHNIQUE:  Endovaginal pelvic sonogram as per order. Transabdominal pelvic sonogram   was also performed as part of our standard protocol. Color and Spectral   Doppler was performed.    FINDINGS:  Uterus: Anteverted. 8.4 cm x  4.9 cm x 4.4 cm. Intrauterine device in   appropriate position  Endometrium: 3 mm. Within normal limits.  Cervix: Scattered nabothian cysts.    Right ovary: 1.5 cm x 1.0 cm x 1.8 cm. Within normal limits. Normal   arterial and venous waveforms.  Left ovary: 3.5 cm x 2.7 cm x 2.3 cm. Status post left salpingectomy   Inclusive of a simple adnexal cyst measuring 2.4 cm Normal arterial and   venous waveforms.    Fluid: No pelvic cul-de-sac fluid.    IMPRESSION:  Unremarkable pelvic sonogram.    --- End of Report ---     MIKAEL DICKERSON MD; Resident Radiologist  This document has been electronically signed.  BRANDIN FOY MD; Attending Radiologist  This document has been electronically signed. 2023  2:25PM    < end of copied text >

## 2023-11-25 NOTE — ED PROVIDER NOTE - OBJECTIVE STATEMENT
Attending note.  Patient was seen in room #20 to the left.  Patient is complaining of continued vaginal bleeding since last menstrual period 1 month ago.  She occasionally passes small clots.  Amount of bleeding fluctuates during the course of the day.  She reports occasional lightheadedness.  She denies any chest pain, shortness of breath or difficulty breathing.  She has no prior episodes of heavy menstruation.  She is not bleeding from any other site or having atraumatic bleeding.  She takes Wellbutrin daily.  She is  3 para 2 with 2 miscarriages and an ectopic pregnancy which resulted in salpingectomy on the left.  She has no allergies to medications.  She is not having significant cramping.  She is traveling to [place] tomorrow.

## 2023-11-25 NOTE — ED ADULT NURSE NOTE - NSFALLUNIVINTERV_ED_ALL_ED
Bed/Stretcher in lowest position, wheels locked, appropriate side rails in place/Call bell, personal items and telephone in reach/Instruct patient to call for assistance before getting out of bed/chair/stretcher/Non-slip footwear applied when patient is off stretcher/[place] to call system/Physically safe environment - no spills, clutter or unnecessary equipment/Purposeful proactive rounding/Room/bathroom lighting operational, light cord in reach

## 2023-11-25 NOTE — ED PROVIDER NOTE - NSICDXFAMILYHX_GEN_ALL_CORE_FT
FAMILY HISTORY:  Father  Still living? Unknown  Family history of pancreatic cancer, Age at diagnosis: Age Unknown    Aunt  Still living? Unknown  Family history of breast cancer, Age at diagnosis: Age Unknown

## 2023-11-25 NOTE — CONSULT NOTE ADULT - ASSESSMENT
Patient is a 42yo  LMP 10/30 presenting for prolonged menstruation lasting 4 weeks which is abnormal for her, prior to last month had regular monthly intervals. On presentation to ED, patient's vital signs wnl, labs reassuring with Hgb 12.9 and negative bHCG, TVUS normal with EMS 3mm, appropriately positioned IUD, several Nabothian cysts which are noncontributory to current sxs. Physical exam also reassuring with minimal blood in vault and no active bleeding through cervix. Patient clinically and hemodynamically stable.    - No acute interventions recommended from GYN standpoint  - Counseled patient that with new onset Abnormal Uterine bleeding at age 43, an outpatient Endometrial biopsy would be recommended. Patient has outpatient follow up with primary GYN, Dr. Gipson, scheduled for 23. Encouraged patient to discuss this recommendation at that visit. Discussed that overall the exact etiology of patient's AUB will warrant outpatient workup.  - No short term medical management indicated in this case due to patient's adequate Hgb 12.9 and reassuring physical exam findings  - Strict ED return precautions discussed: saturating >2 pads in 1hr for >2 consecutive hours, lightheadedness/near syncope  - Patient cleared for discharge to home from GYN standpoint, rest of care per primary ED team    Patient seen at bedside with GYN service attending, Dr. Paolo Horta PGY2

## 2023-11-25 NOTE — ED PROVIDER NOTE - PROGRESS NOTE DETAILS
Pelvic: Normal external genitalia. +Vaginal bleeding noted with some small clots. No pooling. No CMT TTP. Chaperoned by nurse Wing. Natalie Zazueta PA-C GYN aware, will come eval. Natalie Zazueta PA-C GYN recs appreciated. Devin pt, agrees with plan. Copy of results provided. To f/u with primary GYN, Dr. Gipson, scheduled for 12/5/23 and recommended endometrial bx as an outpt. Patient stable for discharge.  Follow up instructions given, strict return to ED precautions reviewed. Importance of follow up emphasized, patient verbalized understanding.  All questions answered. Natlaie Zazueta PA-C

## 2023-11-25 NOTE — ED PROVIDER NOTE - PHYSICAL EXAMINATION
Attending note.  Patient is alert and in no acute distress.  There is no pallor.  Abdomen is soft, flat and nondistended.  There is no masses or tenderness.  There is no guarding or rebound.  Vaginal exam deferred until chaperone available.

## 2024-05-23 ENCOUNTER — APPOINTMENT (OUTPATIENT)
Dept: DERMATOLOGY | Facility: CLINIC | Age: 44
End: 2024-05-23
Payer: COMMERCIAL

## 2024-05-23 ENCOUNTER — APPOINTMENT (OUTPATIENT)
Dept: DERMATOLOGY | Facility: CLINIC | Age: 44
End: 2024-05-23
Payer: SELF-PAY

## 2024-05-23 DIAGNOSIS — L70.0 ACNE VULGARIS: ICD-10-CM

## 2024-05-23 DIAGNOSIS — L21.9 SEBORRHEIC DERMATITIS, UNSPECIFIED: ICD-10-CM

## 2024-05-23 DIAGNOSIS — D22.9 MELANOCYTIC NEVI, UNSPECIFIED: ICD-10-CM

## 2024-05-23 DIAGNOSIS — L82.0 INFLAMED SEBORRHEIC KERATOSIS: ICD-10-CM

## 2024-05-23 DIAGNOSIS — Z12.83 ENCOUNTER FOR SCREENING FOR MALIGNANT NEOPLASM OF SKIN: ICD-10-CM

## 2024-05-23 DIAGNOSIS — L82.1 OTHER SEBORRHEIC KERATOSIS: ICD-10-CM

## 2024-05-23 DIAGNOSIS — L30.9 DERMATITIS, UNSPECIFIED: ICD-10-CM

## 2024-05-23 DIAGNOSIS — L71.9 ROSACEA, UNSPECIFIED: ICD-10-CM

## 2024-05-23 LAB
25(OH)D3 SERPL-MCNC: 73.7 NG/ML
FERRITIN SERPL-MCNC: 25 NG/ML
TSH SERPL-ACNC: 2.72 UIU/ML

## 2024-05-23 PROCEDURE — 11900 INJECT SKIN LESIONS </W 7: CPT

## 2024-05-23 PROCEDURE — 1D0170: CUSTOM

## 2024-05-23 PROCEDURE — 99204 OFFICE O/P NEW MOD 45 MIN: CPT | Mod: 25

## 2024-05-23 RX ORDER — MINOXIDIL 2.5 MG/1
2.5 TABLET ORAL
Qty: 30 | Refills: 1 | Status: ACTIVE | COMMUNITY
Start: 2024-05-23 | End: 1900-01-01

## 2024-05-23 RX ORDER — HYDROCORTISONE 25 MG/G
2.5 OINTMENT TOPICAL
Qty: 1 | Refills: 11 | Status: ACTIVE | COMMUNITY
Start: 2024-05-23 | End: 1900-01-01

## 2024-05-23 RX ORDER — IVERMECTIN 10 MG/G
1 CREAM TOPICAL
Qty: 1 | Refills: 6 | Status: ACTIVE | COMMUNITY
Start: 2024-05-23 | End: 1900-01-01

## 2024-05-23 NOTE — ASSESSMENT
[FreeTextEntry1] : PRP session # 1 Last PRP Session n/a  Platelet Rich Plasma (PRP) used for Androgenetic Alopecia today. Alternatives discussed with patient including no treatment, Rogaine and Propecia. Discussed none of these methods are a guarantee for success and are used as option for patients with hair loss. Discussed PRP has not consistently shown it is effective for hair loss but is an option that may help.  A small quantity of blood (30 cc) was drawn from the patient into a syringe. The blood was spun in the PRP machine following protocol from the manual. The platelet rich plasma was  from the rest of the blood. PRP was then injected directly into thinning areas of the scalp.  Discussed Risks and Complications including pain or itching at the injection site  bleeding, bruising, swelling and/or infection  temporary pinkness/redness (flushing) of the skin  allergic reactions to the solution  injury to a nerve from the injection  nausea/vomiting  adelfo-operative dizziness or fainting infection  Written consent was obtained for the procedure and all questions were answered for the patient. Explained medicine is not an exact science and patient acknowledged that no guarantee has been given or implied by anyone as to the results that may be obtained by this treatment.   Patient verbalized understanding procedure is elective and not covered by insurance.

## 2024-05-23 NOTE — ASSESSMENT
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 May increase to 1.25mg in one month if tolerating well. SED including peripheral edema, palpitations, dizziness and lightheadedness, and hypertrichosis, amongst others; discussed cases of pericardial edema/pericarditis. - referral to Dr. Navarrete for PRP injections  - psychiatry referral placed today to manage stress per patient preference - will check TSH, ferritin, vit D  #Rosacea, chronic; flaring - continue IPL treatments  - START topical ivermectin 1% cream daily  - has failed keto cream  #acne, mild inflammatory, chronic; stable #firm papule/cyst on the left cheek Intralesional injection of Kenalog, 2.5 mg/ml A total of 0.1 ml was injected. Sites injected: 1 The risks/benefits/alternatives of intralesional steroid injections were reviewed with the patient which include but are not limited to pain, atrophy, and dyspigmentation.  #Dermatitis, nipple, new diagnosis with uncertain prognosis  Suspect irritant vs. eczematous  Reviewed risks (as well as mitigation strategies for adverse drug events as applicable), benefits, and alternatives of therapy  - Start hydrocortisone 2.5% ointment to AA BID for 2 weeks on, 1 week off. SED. r/b/a topical steroids were discussed including but not limited to thinning of the skin, atrophy and dyspigmentation.  - Discussed liberal use of non-comedogenic moisturizers. Examples include vaseline, cetaphil, and cerave moisturizing cream.  #Inflamed Seborrheic Keratosis x 1, R hand and R shin   - Given inflammatory nature of lesions above/bothersome to patient, will treat with cryosurgery - Patient was verbally consented. Lesions treated with liquid nitrogen f/t/f x2 cycles. Patient tolerated it well.  - Side effects include blister formation and PIH  - if not covered, gratis  #Multiple melanocytic nevi, benign - education, counseling, reassurance - ABCDEs of melanoma reviewed, rtc sooner if changing  FBSE/Healthcare maintenance -Sun protection was discussed. The proper use of broad-spectrum UVA/UVB sunscreens with SPF 30 or greater was reviewed and the need for re-application after swimming or sweating or 2-3 hours was emphasized. We talked about judicious use of clothing and avoidance of peak periods of sun exposure. I made the patient aware of the need for year-round protection. ABCDEs of melanoma were also reviewed. -Self-skin checks were also reviewed, rtc sooner if changed noted -Counseled patient to monitor for changes - FBSE completed today, no lesions concerning for malignancy. Next FBSE due 1 year  RTC 3 months for hair loss

## 2024-05-23 NOTE — HISTORY OF PRESENT ILLNESS
[FreeTextEntry1] : NV hair loss, spots  [de-identified] : PEBBLES BAEZ is a 43 year old female presenting for:  1. Seborrheic dermatitis on eyebrows and scalp. Scalp seb derm is well-controlled with Ahava PsO shampoo. Uses hydrocortisone ointment as needed for flares on eyebrows.  2. Two scaly brown spots on her right hand and right leg.  3. Rosacea, being treated with IPL laser. Also intermittently gets pink bumps on cheeks. 4. Hair loss for years, though worsening over the past 6 months. Scalp is not itchy nor painful. No recent pregnancies or illnesses. Reporting a lot of stress from her family and job. Started taking prenatal vitamins a few months ago.  5. Itching on her nipples for months. Has been moisturizing them with creams and intermittently using HC ointment.  6. Spots scattered on body x years. Asymptomatic and unchanged. No alleviating/aggravating factors. Never been treated.   Derm Hx: no hx skin cancer, guttate psoriasis, seb derm  Family Hx: no hx skin cancer  Social Hx:  for a tech company  PMHx: GERD Allergies: None  Medications: pepsid, statin, wellbutrin

## 2024-05-23 NOTE — PHYSICAL EXAM
[FreeTextEntry3] : PE:  General: well-appearing, alert, in no acute distress  Full body skin exam performed examining scalp, head, face, ears, eyes, mouth, neck, chest, back, abdomen, axilla, b/l arms, b/l forearms, b/l hands, b/l fingernails, b/l thighs, b/l legs, b/l feet, b/l toenails. Buttocks, groin and genitalia deferred per patient. Pertinent findings include: -scalp without greasy scale or erythema diffuse mild hair thinning with variable size of hair shafts on dermoscopy +hair pull test  thin pink greasy plaques on eyebrows and glabella  mild erythema around Gomez tubercles of areolas bilaterally  brown stuck on papules on the right dorsal hand and right shin  centrofacial erythema with few pink papules on cheeks  firm pink papule on left cheek  uniform brown macules and papules over trunk and extremities

## 2024-06-27 ENCOUNTER — APPOINTMENT (OUTPATIENT)
Dept: DERMATOLOGY | Facility: CLINIC | Age: 44
End: 2024-06-27
Payer: SELF-PAY

## 2024-06-27 DIAGNOSIS — L65.9 NONSCARRING HAIR LOSS, UNSPECIFIED: ICD-10-CM

## 2024-06-27 PROCEDURE — 1D0170: CUSTOM

## 2024-08-15 ENCOUNTER — APPOINTMENT (OUTPATIENT)
Dept: DERMATOLOGY | Facility: CLINIC | Age: 44
End: 2024-08-15
Payer: SELF-PAY

## 2024-08-15 DIAGNOSIS — L65.9 NONSCARRING HAIR LOSS, UNSPECIFIED: ICD-10-CM

## 2024-08-15 PROCEDURE — 1D0170: CUSTOM

## 2024-08-15 RX ORDER — MINOXIDIL 2.5 MG/1
2.5 TABLET ORAL DAILY
Qty: 90 | Refills: 3 | Status: ACTIVE | COMMUNITY
Start: 2024-08-15 | End: 1900-01-01

## 2024-08-15 NOTE — HISTORY OF PRESENT ILLNESS
[FreeTextEntry1] : PRP [de-identified] : 43 year old PRP. Reports less hair shedding  and strengthen hair since PRP

## 2024-08-15 NOTE — ASSESSMENT
[FreeTextEntry1] : PRP session # 3 today Session #1 on 05/23/2024  Platelet Rich Plasma (PRP) used for Androgenetic Alopecia today. Alternatives discussed with patient including no treatment, Rogaine and Propecia. Discussed none of these methods are a guarantee for success and are used as option for patients with hair loss. Discussed PRP has not consistently shown it is effective for hair loss but is an option that may help.  A small quantity of blood (30 cc) was drawn from the patient into a syringe. The blood was spun in the PRP machine following protocol from the manual. The platelet rich plasma was  from the rest of the blood. PRP was then injected directly into thinning areas of the scalp.  Discussed Risks and Complications including pain or itching at the injection site  bleeding, bruising, swelling and/or infection  temporary pinkness/redness (flushing) of the skin  allergic reactions to the solution  injury to a nerve from the injection  nausea/vomiting  adelfo-operative dizziness or fainting infection  Written consent was obtained for the procedure and all questions were answered for the patient. Explained medicine is not an exact science and patient acknowledged that no guarantee has been given or implied by anyone as to the results that may be obtained by this treatment.   Patient verbalized understanding procedure is elective and not covered by insurance.   RTC 6 weeks

## 2024-08-19 NOTE — HISTORY OF PRESENT ILLNESS
[FreeTextEntry1] : PRP [de-identified] : 43 year old PRP. Reports less hair shedding  and strengthen hair since PRP

## 2024-08-27 ENCOUNTER — APPOINTMENT (OUTPATIENT)
Dept: DERMATOLOGY | Facility: CLINIC | Age: 44
End: 2024-08-27

## 2024-09-25 ENCOUNTER — APPOINTMENT (OUTPATIENT)
Dept: DERMATOLOGY | Facility: CLINIC | Age: 44
End: 2024-09-25

## 2024-10-18 ENCOUNTER — APPOINTMENT (OUTPATIENT)
Dept: DERMATOLOGY | Facility: CLINIC | Age: 44
End: 2024-10-18

## 2024-10-24 ENCOUNTER — APPOINTMENT (OUTPATIENT)
Dept: DERMATOLOGY | Facility: CLINIC | Age: 44
End: 2024-10-24

## 2024-11-20 ENCOUNTER — APPOINTMENT (OUTPATIENT)
Dept: DERMATOLOGY | Facility: CLINIC | Age: 44
End: 2024-11-20
Payer: SELF-PAY

## 2024-11-20 DIAGNOSIS — L65.9 NONSCARRING HAIR LOSS, UNSPECIFIED: ICD-10-CM

## 2024-11-20 PROCEDURE — 1D0170: CUSTOM

## 2024-11-20 RX ORDER — MINOXIDIL 2.5 MG/1
2.5 TABLET ORAL
Qty: 45 | Refills: 1 | Status: ACTIVE | COMMUNITY
Start: 2024-11-20 | End: 1900-01-01

## 2024-12-10 ENCOUNTER — APPOINTMENT (OUTPATIENT)
Dept: DERMATOLOGY | Facility: CLINIC | Age: 44
End: 2024-12-10
Payer: COMMERCIAL

## 2024-12-10 VITALS — WEIGHT: 155 LBS | HEIGHT: 66 IN | BODY MASS INDEX: 24.91 KG/M2

## 2024-12-10 VITALS — BODY MASS INDEX: 24.91 KG/M2 | WEIGHT: 155 LBS | HEIGHT: 66 IN

## 2024-12-10 DIAGNOSIS — L65.9 NONSCARRING HAIR LOSS, UNSPECIFIED: ICD-10-CM

## 2024-12-10 DIAGNOSIS — L64.9 ANDROGENIC ALOPECIA, UNSPECIFIED: ICD-10-CM

## 2024-12-10 DIAGNOSIS — L71.9 ROSACEA, UNSPECIFIED: ICD-10-CM

## 2024-12-10 PROCEDURE — 99214 OFFICE O/P EST MOD 30 MIN: CPT

## 2025-06-02 ENCOUNTER — RX RENEWAL (OUTPATIENT)
Age: 45
End: 2025-06-02

## 2025-06-30 ENCOUNTER — RX RENEWAL (OUTPATIENT)
Age: 45
End: 2025-06-30

## 2025-09-11 ENCOUNTER — APPOINTMENT (OUTPATIENT)
Dept: DERMATOLOGY | Facility: CLINIC | Age: 45
End: 2025-09-11
Payer: SELF-PAY

## 2025-09-11 DIAGNOSIS — L64.9 ANDROGENIC ALOPECIA, UNSPECIFIED: ICD-10-CM

## 2025-09-11 PROCEDURE — 1D0170: CUSTOM
